# Patient Record
Sex: MALE | Race: WHITE | Employment: OTHER | URBAN - METROPOLITAN AREA
[De-identification: names, ages, dates, MRNs, and addresses within clinical notes are randomized per-mention and may not be internally consistent; named-entity substitution may affect disease eponyms.]

---

## 2017-03-24 ENCOUNTER — TELEPHONE (OUTPATIENT)
Dept: INTERNAL MEDICINE CLINIC | Age: 57
End: 2017-03-24

## 2017-03-24 NOTE — TELEPHONE ENCOUNTER
Pt is wanting a call back in regards to getting a sooner NP appt with Dr. Michell Hendricks that's available. Please call pt to advise.

## 2021-01-11 ENCOUNTER — HOSPITAL ENCOUNTER (EMERGENCY)
Age: 61
Discharge: HOME | End: 2021-01-11
Attending: EMERGENCY MEDICINE
Payer: MEDICAID

## 2021-01-11 ENCOUNTER — APPOINTMENT (OUTPATIENT)
Dept: GENERAL RADIOLOGY | Age: 61
End: 2021-01-11
Attending: STUDENT IN AN ORGANIZED HEALTH CARE EDUCATION/TRAINING PROGRAM
Payer: MEDICAID

## 2021-01-11 VITALS
DIASTOLIC BLOOD PRESSURE: 84 MMHG | RESPIRATION RATE: 18 BRPM | TEMPERATURE: 98.2 F | HEART RATE: 68 BPM | OXYGEN SATURATION: 100 % | SYSTOLIC BLOOD PRESSURE: 134 MMHG

## 2021-01-11 DIAGNOSIS — S60.222A CONTUSION OF LEFT HAND, INITIAL ENCOUNTER: Primary | ICD-10-CM
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
## 2021-01-11 PROCEDURE — 73130 X-RAY EXAM OF HAND: CPT

## 2021-01-11 PROCEDURE — 99282 EMERGENCY DEPT VISIT SF MDM: CPT

## 2021-01-11 RX ORDER — NAPROXEN SODIUM 220 MG
440 TABLET ORAL
Qty: 20 TAB | Refills: 0 | Status: SHIPPED | OUTPATIENT
Start: 2021-01-11 | End: 2021-03-03 | Stop reason: ALTCHOICE
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
## 2021-01-11 NOTE — Clinical Note
[address] 
[facility] EMERGENCY DEPT 
[address] [place] [id number] 
[phone] Work/School Note Date: 1/11/2021 To Whom It May concern: 
 
 
Rae Bruno was seen and treated today in the emergency room by the following provider(s): 
Attending Provider: Nayely Hutchinson MD 
Resident: Jeanne Parker MD.   
 
Rae Bruno is excused from work/school on 01/11/21. He is clear to return to work/school on 01/12/21. Sincerely, Dorothy Beckett MD

## 2021-01-11 NOTE — DISCHARGE INSTRUCTIONS
You may take ibuprofen 600mg every 6 hours as needed or naproxen 440mg every 12 hours as needed. Please do not take both. They are both in the family of NSAIDs and can cause stomach upset and kidney injury in overdose. Please do not take for longer than one week at a time. You may also use ice packs on and off for 15 minutes at a time to reduce swelling and pain. Please follow-up with Dr. Thania Balderrama as above regarding the pain in your right hand for further recommendations. Thank you!

## 2021-01-11 NOTE — ED NOTES
Pt discharged by Evangelina. Pt provided with discharge instructions RX and instructions on follow up care. Pt out of ED with no apparent difficulty accompanied by RN.

## 2021-02-21 ENCOUNTER — APPOINTMENT (OUTPATIENT)
Dept: GENERAL RADIOLOGY | Age: 61
End: 2021-02-21
Attending: EMERGENCY MEDICINE
Payer: MEDICAID

## 2021-02-21 ENCOUNTER — HOSPITAL ENCOUNTER (EMERGENCY)
Age: 61
Discharge: HOME | End: 2021-02-21
Attending: EMERGENCY MEDICINE
Payer: MEDICAID

## 2021-02-21 VITALS
WEIGHT: 188.93 LBS | SYSTOLIC BLOOD PRESSURE: 127 MMHG | HEART RATE: 69 BPM | OXYGEN SATURATION: 95 % | HEIGHT: 67 IN | BODY MASS INDEX: 29.65 KG/M2 | DIASTOLIC BLOOD PRESSURE: 80 MMHG | RESPIRATION RATE: 17 BRPM | TEMPERATURE: 98.2 F

## 2021-02-21 DIAGNOSIS — R07.9 CHEST PAIN, UNSPECIFIED TYPE: Primary | ICD-10-CM

## 2021-02-21 LAB
ALBUMIN SERPL-MCNC: 3.7 G/DL (ref 3.5–5)
ALBUMIN/GLOB SERPL: 1.1 {RATIO} (ref 1.1–2.2)
ALP SERPL-CCNC: 53 U/L (ref 45–117)
ALT SERPL-CCNC: 27 U/L (ref 12–78)
ANION GAP SERPL CALC-SCNC: 4 MMOL/L (ref 5–15)
AST SERPL-CCNC: 15 U/L (ref 15–37)
ATRIAL RATE: 76 BPM
BASOPHILS # BLD: 0.1 K/UL (ref 0–0.1)
BASOPHILS NFR BLD: 1 % (ref 0–1)
BILIRUB SERPL-MCNC: 0.7 MG/DL (ref 0.2–1)
BUN SERPL-MCNC: 19 MG/DL (ref 6–20)
BUN/CREAT SERPL: 19 (ref 12–20)
CALCIUM SERPL-MCNC: 8.7 MG/DL (ref 8.5–10.1)
CALCULATED P AXIS, ECG09: 9 DEGREES
CALCULATED R AXIS, ECG10: 13 DEGREES
CALCULATED T AXIS, ECG11: 24 DEGREES
CHLORIDE SERPL-SCNC: 108 MMOL/L (ref 97–108)
CK SERPL-CCNC: 79 U/L (ref 39–308)
CO2 SERPL-SCNC: 28 MMOL/L (ref 21–32)
COMMENT, HOLDF: NORMAL
CREAT SERPL-MCNC: 0.99 MG/DL (ref 0.7–1.3)
DIAGNOSIS, 93000: NORMAL
DIFFERENTIAL METHOD BLD: ABNORMAL
EOSINOPHIL # BLD: 0.2 K/UL (ref 0–0.4)
EOSINOPHIL NFR BLD: 3 % (ref 0–7)
ERYTHROCYTE [DISTWIDTH] IN BLOOD BY AUTOMATED COUNT: 12.3 % (ref 11.5–14.5)
GLOBULIN SER CALC-MCNC: 3.3 G/DL (ref 2–4)
GLUCOSE SERPL-MCNC: 86 MG/DL (ref 65–100)
HCT VFR BLD AUTO: 40 % (ref 36.6–50.3)
HGB BLD-MCNC: 13.8 G/DL (ref 12.1–17)
IMM GRANULOCYTES # BLD AUTO: 0 K/UL (ref 0–0.04)
IMM GRANULOCYTES NFR BLD AUTO: 1 % (ref 0–0.5)
LYMPHOCYTES # BLD: 2.2 K/UL (ref 0.8–3.5)
LYMPHOCYTES NFR BLD: 27 % (ref 12–49)
MCH RBC QN AUTO: 31.7 PG (ref 26–34)
MCHC RBC AUTO-ENTMCNC: 34.5 G/DL (ref 30–36.5)
MCV RBC AUTO: 92 FL (ref 80–99)
MONOCYTES # BLD: 0.6 K/UL (ref 0–1)
MONOCYTES NFR BLD: 7 % (ref 5–13)
NEUTS SEG # BLD: 5 K/UL (ref 1.8–8)
NEUTS SEG NFR BLD: 61 % (ref 32–75)
NRBC # BLD: 0 K/UL (ref 0–0.01)
NRBC BLD-RTO: 0 PER 100 WBC
P-R INTERVAL, ECG05: 206 MS
PLATELET # BLD AUTO: 231 K/UL (ref 150–400)
PMV BLD AUTO: 9.9 FL (ref 8.9–12.9)
POTASSIUM SERPL-SCNC: 4.2 MMOL/L (ref 3.5–5.1)
PROT SERPL-MCNC: 7 G/DL (ref 6.4–8.2)
Q-T INTERVAL, ECG07: 384 MS
QRS DURATION, ECG06: 80 MS
QTC CALCULATION (BEZET), ECG08: 432 MS
RBC # BLD AUTO: 4.35 M/UL (ref 4.1–5.7)
SAMPLES BEING HELD,HOLD: NORMAL
SODIUM SERPL-SCNC: 140 MMOL/L (ref 136–145)
TROPONIN I SERPL-MCNC: <0.05 NG/ML
VENTRICULAR RATE, ECG03: 76 BPM
WBC # BLD AUTO: 8 K/UL (ref 4.1–11.1)

## 2021-02-21 PROCEDURE — 85025 COMPLETE CBC W/AUTO DIFF WBC: CPT

## 2021-02-21 PROCEDURE — 84484 ASSAY OF TROPONIN QUANT: CPT

## 2021-02-21 PROCEDURE — 74011000250 HC RX REV CODE- 250: Performed by: EMERGENCY MEDICINE

## 2021-02-21 PROCEDURE — 80053 COMPREHEN METABOLIC PANEL: CPT

## 2021-02-21 PROCEDURE — 96374 THER/PROPH/DIAG INJ IV PUSH: CPT

## 2021-02-21 PROCEDURE — 71045 X-RAY EXAM CHEST 1 VIEW: CPT

## 2021-02-21 PROCEDURE — 82550 ASSAY OF CK (CPK): CPT

## 2021-02-21 PROCEDURE — 93005 ELECTROCARDIOGRAM TRACING: CPT

## 2021-02-21 PROCEDURE — 99284 EMERGENCY DEPT VISIT MOD MDM: CPT

## 2021-02-21 PROCEDURE — 36415 COLL VENOUS BLD VENIPUNCTURE: CPT

## 2021-02-21 PROCEDURE — 74011250636 HC RX REV CODE- 250/636: Performed by: EMERGENCY MEDICINE

## 2021-02-21 RX ORDER — KETOROLAC TROMETHAMINE 30 MG/ML
30 INJECTION, SOLUTION INTRAMUSCULAR; INTRAVENOUS
Status: COMPLETED | OUTPATIENT
Start: 2021-02-21 | End: 2021-02-21

## 2021-02-21 RX ORDER — LIDOCAINE 4 G/100G
1 PATCH TOPICAL EVERY 24 HOURS
Status: DISCONTINUED | OUTPATIENT
Start: 2021-02-21 | End: 2021-02-22 | Stop reason: HOSPADM

## 2021-02-21 RX ADMIN — KETOROLAC TROMETHAMINE 30 MG: 30 INJECTION, SOLUTION INTRAMUSCULAR at 21:41

## 2021-02-22 NOTE — ED PROVIDER NOTES
EMERGENCY DEPARTMENT HISTORY AND PHYSICAL EXAM      Date: 2/21/2021  Patient Name: Julianne Brown    History of Presenting Illness     Chief Complaint   Patient presents with    Chest Pain     reports chest tightness. notes he did some deep breathing exercises earlier today and is concerned he may have a lung problem. History Provided By: Patient    HPI: Julianne Brown, 62 y.o. male presents to the ED with cc of chest pain. Patient is a 62 YOM with a history of tobacco abuse who presents with L chest pain. Patient reports he has history of tobacco abuse, no history of CAD. No recent exertional chest pain. Patient reports he woke up with left chest wall pain. Pain is located in his left chest, and is worse with twisting. Improved by nothing. It is also pleuritic. He denies cough, hemoptysis. Denies history of DVT or PE. Denies leg swelling. Patient reports he was in his normal state of health until 10 AM this morning when he did some \"deep breathing. \"    Patient otherwise been in normal state of health. Denies associated shortness of breath, diaphoresis or nausea. Denies any other complaints. There are no other complaints, changes, or physical findings at this time. PCP: Tee Davison MD    No current facility-administered medications on file prior to encounter. Current Outpatient Medications on File Prior to Encounter   Medication Sig Dispense Refill    naproxen sodium (Aleve) 220 mg tablet Take 2 Tabs by mouth every twelve (12) hours as needed for Pain. 20 Tab 0    naproxen (NAPROSYN) 500 mg tablet Take 1 tablet by mouth two (2) times daily (with meals). 30 tablet 1    finasteride (PROSCAR) 5 mg tablet Take 1 Tab by mouth daily.  30 Tab 11       Past History     Past Medical History:  Past Medical History:   Diagnosis Date    Carpal tunnel syndrome     Lower urinary tract symptoms (LUTS)        Past Surgical History:  Past Surgical History:   Procedure Laterality Date    HX CHOLECYSTECTOMY         Family History:  No family history on file. Social History:  Social History     Tobacco Use    Smoking status: Current Some Day Smoker     Types: Cigarettes     Last attempt to quit: 2014     Years since quittin.1    Smokeless tobacco: Current User    Tobacco comment: 1 a week   Substance Use Topics    Alcohol use: Yes     Comment: occasonally on the weekends    Drug use: No       Allergies:  No Known Allergies      Review of Systems   Review of Systems   Constitutional: Negative for fever. HENT: Negative for voice change. Eyes: Negative for pain and redness. Respiratory: Negative for cough and chest tightness. Cardiovascular: Positive for chest pain. Negative for leg swelling. Gastrointestinal: Negative for abdominal pain, diarrhea, nausea and vomiting. Genitourinary: Negative for hematuria. Musculoskeletal: Negative for gait problem. Skin: Negative for color change, pallor and rash. Neurological: Negative for facial asymmetry, weakness and headaches. Hematological: Does not bruise/bleed easily. Psychiatric/Behavioral: Negative for behavioral problems. All other systems reviewed and are negative. Physical Exam   Physical Exam  Vitals signs and nursing note reviewed. Constitutional:       Comments: 44-year-old male, resting in bed, no acute distress   HENT:      Head: Normocephalic. Right Ear: External ear normal.      Left Ear: External ear normal.      Nose: Nose normal.   Eyes:      Conjunctiva/sclera: Conjunctivae normal.   Cardiovascular:      Rate and Rhythm: Normal rate and regular rhythm. Heart sounds: No murmur. No friction rub. No gallop. Pulmonary:      Effort: Pulmonary effort is normal.      Breath sounds: Normal breath sounds. No wheezing, rhonchi or rales. Chest:      Chest wall: Tenderness (Left costochondral margin) present. Abdominal:      Palpations: Abdomen is soft. Tenderness:  There is no abdominal tenderness. Musculoskeletal: Normal range of motion. Skin:     General: Skin is warm. Capillary Refill: Capillary refill takes less than 2 seconds. Neurological:      Mental Status: He is alert. Mental status is at baseline. Psychiatric:         Mood and Affect: Mood normal.         Behavior: Behavior normal.         Diagnostic Study Results     Labs -     Recent Results (from the past 12 hour(s))   EKG, 12 LEAD, INITIAL    Collection Time: 02/21/21  7:13 PM   Result Value Ref Range    Ventricular Rate 76 BPM    Atrial Rate 76 BPM    P-R Interval 206 ms    QRS Duration 80 ms    Q-T Interval 384 ms    QTC Calculation (Bezet) 432 ms    Calculated P Axis 9 degrees    Calculated R Axis 13 degrees    Calculated T Axis 24 degrees    Diagnosis       Normal sinus rhythm  Low voltage QRS  No previous ECGs available     CBC WITH AUTOMATED DIFF    Collection Time: 02/21/21  8:05 PM   Result Value Ref Range    WBC 8.0 4.1 - 11.1 K/uL    RBC 4.35 4.10 - 5.70 M/uL    HGB 13.8 12.1 - 17.0 g/dL    HCT 40.0 36.6 - 50.3 %    MCV 92.0 80.0 - 99.0 FL    MCH 31.7 26.0 - 34.0 PG    MCHC 34.5 30.0 - 36.5 g/dL    RDW 12.3 11.5 - 14.5 %    PLATELET 049 088 - 070 K/uL    MPV 9.9 8.9 - 12.9 FL    NRBC 0.0 0  WBC    ABSOLUTE NRBC 0.00 0.00 - 0.01 K/uL    NEUTROPHILS 61 32 - 75 %    LYMPHOCYTES 27 12 - 49 %    MONOCYTES 7 5 - 13 %    EOSINOPHILS 3 0 - 7 %    BASOPHILS 1 0 - 1 %    IMMATURE GRANULOCYTES 1 (H) 0.0 - 0.5 %    ABS. NEUTROPHILS 5.0 1.8 - 8.0 K/UL    ABS. LYMPHOCYTES 2.2 0.8 - 3.5 K/UL    ABS. MONOCYTES 0.6 0.0 - 1.0 K/UL    ABS. EOSINOPHILS 0.2 0.0 - 0.4 K/UL    ABS. BASOPHILS 0.1 0.0 - 0.1 K/UL    ABS. IMM.  GRANS. 0.0 0.00 - 0.04 K/UL    DF AUTOMATED     METABOLIC PANEL, COMPREHENSIVE    Collection Time: 02/21/21  8:05 PM   Result Value Ref Range    Sodium 140 136 - 145 mmol/L    Potassium 4.2 3.5 - 5.1 mmol/L    Chloride 108 97 - 108 mmol/L    CO2 28 21 - 32 mmol/L    Anion gap 4 (L) 5 - 15 mmol/L    Glucose 86 65 - 100 mg/dL    BUN 19 6 - 20 MG/DL    Creatinine 0.99 0.70 - 1.30 MG/DL    BUN/Creatinine ratio 19 12 - 20      GFR est AA >60 >60 ml/min/1.73m2    GFR est non-AA >60 >60 ml/min/1.73m2    Calcium 8.7 8.5 - 10.1 MG/DL    Bilirubin, total 0.7 0.2 - 1.0 MG/DL    ALT (SGPT) 27 12 - 78 U/L    AST (SGOT) 15 15 - 37 U/L    Alk. phosphatase 53 45 - 117 U/L    Protein, total 7.0 6.4 - 8.2 g/dL    Albumin 3.7 3.5 - 5.0 g/dL    Globulin 3.3 2.0 - 4.0 g/dL    A-G Ratio 1.1 1.1 - 2.2     TROPONIN I    Collection Time: 02/21/21  8:05 PM   Result Value Ref Range    Troponin-I, Qt. <0.05 <0.05 ng/mL   CK W/ REFLX CKMB    Collection Time: 02/21/21  8:05 PM   Result Value Ref Range    CK 79 39 - 308 U/L   SAMPLES BEING HELD    Collection Time: 02/21/21  8:05 PM   Result Value Ref Range    SAMPLES BEING HELD  RED, SST     COMMENT        Add-on orders for these samples will be processed based on acceptable specimen integrity and analyte stability, which may vary by analyte. Radiologic Studies -   XR CHEST PORT   Final Result   Mild pulmonary interstitial edema pattern. Otherwise, no acute   process. CT Results  (Last 48 hours)    None        CXR Results  (Last 48 hours)               02/21/21 1924  XR CHEST PORT Final result    Impression:  Mild pulmonary interstitial edema pattern. Otherwise, no acute   process. Narrative:  INDICATION: Chest pain       FINDINGS: AP portable imaging of the chest performed at 7:16 PM demonstrates a   normal cardiomediastinal silhouette. There is a mild pulmonary interstitial   edema pattern. No focal consolidation or pleural effusion is evident. . No   significant osseous abnormalities are seen. Medical Decision Making   I am the first provider for this patient. I reviewed the vital signs, available nursing notes, past medical history, past surgical history, family history and social history. Vital Signs-Reviewed the patient's vital signs.   Patient Vitals for the past 12 hrs:   Temp Pulse Resp BP SpO2   02/21/21 2000  80 24 118/74 99 %   02/21/21 1902 98.2 °F (36.8 °C) 80 16 (!) 142/87 97 %       EKG interpretation: (Preliminary)    Preliminary EKG interpreted by me. Shows sinus rhythm with a HR of 76. No ST elevations or depressions concerning for ischemia. Normal intervals. Records Reviewed: Nursing Notes and Old Medical Records    Provider Notes (Medical Decision Making):     71-year-old male presents emergency department with chief complaint of left chest wall pain. His vitals are stable he is not hypoxic. He has bilateral breath sounds. His pain is reproducible and atypical.  EKG is nonischemic. Given reproducibility on my exam and no risk factors doubt DVT or PE. Checked chest x-ray to exclude pneumothorax which is negative. Doubt pneumonia. Radiologist read on CXR mild interstitial pattern, but patient has no rales or crackles or signs or symptoms of CHF. ED Course:   Initial assessment performed. The patients presenting problems have been discussed, and they are in agreement with the care plan formulated and outlined with them. I have encouraged them to ask questions as they arise throughout their visit. ED Course as of Feb 21 2158   [facility] Feb 21, 2021 2109 Labs and troponin are unremarkable. [MB]   2129 Reassessed patient, resting comfortably in bed. Will give Toradol and lidocaine patch. Discussed patient's chest x-ray findings, declined Covid test.  Again I think these are mainly incidental as he does not appear fluid overloaded or in CHF exacerbation. [MB]      ED Course User Index  [MB] MD Rei Roe MD      Disposition:    Discharged    DISCHARGE PLAN:  1. Current Discharge Medication List        2.    Follow-up Information     Follow up With Specialties Details Why Contact Info    Tee Davison MD Internal Medicine In 1 week  [address] [place] [address] [place] [address]  [phone]      Malika Ramsey MD Cardiology, [facility], Internal Medicine In 1 week As needed [address] [facility]  [phone]          3. Return to ED if worse     Diagnosis     Clinical Impression:   1. Chest pain, unspecified type        Attestations:    Aldo Contreras MD    Please note that this dictation was completed with [employer], the computer voice recognition software. Quite often unanticipated grammatical, syntax, homophones, and other interpretive errors are inadvertently transcribed by the computer software. Please disregard these errors. Please excuse any errors that have escaped final proofreading. Thank you.

## 2021-02-22 NOTE — ED NOTES
12 - assumed care;;    ED visit d/t chest pain / sob - onset of sxs, this AM s/p \" breathing exercises \" - pt reports having \" feeling weird in the morning leading to mild sob, leading to deep breathing exercises, admit to smoking 2 to 3 cigarette daily for \" 40 years \" - pt with active central chest pain with no radiation - A/Ox4 at this time - placed on cardiac monitor x4     Denies nausea / vomiting / vision changes / cardiac hx / weight gain / lower extremity swelling     2125 - Alejandro CARVAJAL at bedside for reeval    Hx of gall bladder removal

## 2021-03-03 ENCOUNTER — OFFICE VISIT (OUTPATIENT)
Dept: CARDIOLOGY CLINIC | Age: 61
End: 2021-03-03
Payer: MEDICAID

## 2021-03-03 VITALS
DIASTOLIC BLOOD PRESSURE: 76 MMHG | RESPIRATION RATE: 18 BRPM | BODY MASS INDEX: 29.19 KG/M2 | HEART RATE: 64 BPM | HEIGHT: 67 IN | WEIGHT: 186 LBS | SYSTOLIC BLOOD PRESSURE: 128 MMHG | OXYGEN SATURATION: 96 %

## 2021-03-03 DIAGNOSIS — E78.2 MIXED HYPERLIPIDEMIA: ICD-10-CM

## 2021-03-03 DIAGNOSIS — R07.9 CHEST PAIN, UNSPECIFIED TYPE: Primary | ICD-10-CM

## 2021-03-03 DIAGNOSIS — F17.200 TOBACCO USE DISORDER: ICD-10-CM

## 2021-03-03 PROCEDURE — 93000 ELECTROCARDIOGRAM COMPLETE: CPT | Performed by: INTERNAL MEDICINE

## 2021-03-03 PROCEDURE — 99204 OFFICE O/P NEW MOD 45 MIN: CPT | Performed by: INTERNAL MEDICINE

## 2021-03-03 RX ORDER — MONTELUKAST SODIUM 4 MG/1
1 TABLET, CHEWABLE ORAL
COMMUNITY
Start: 2021-01-28

## 2021-03-03 NOTE — PROGRESS NOTES
Alexys Bentley, JOSE RAMON-BC    Subjective/HPI:     Monica Newman is a 62 y.o. male is here to establish care. He has no significant PMHx. Here for evaluation of chest pain. Was seen in the ER two weeks ago for chest pain symptoms. Started in the morning and lasted a few hours before he presented to the ER. Workup there was unremarkable, non-ischemic EKG, normal chemistries and troponin. Was discharged with recommendation to follow up with us outpatient. Still having symptoms from time to time, not related to exertion. States \"movement\" brings it on, like bending or stooping. Some worsening with inspiration. Took some Aleve without much relief. Is a current smoker, 2-3 cigarettes per day. Reports the night prior to his chest pain symptoms, he smoked 12 cigarettes. No previous history of CAD. No family hx of CAD. Used to smoke marijuana occasionally, and did cocaine about 15-20 years ago, has been abstinent. Past Medical History:   Diagnosis Date    Carpal tunnel syndrome     Lower urinary tract symptoms (LUTS)        Past Surgical History:   Procedure Laterality Date    HX CHOLECYSTECTOMY         No family history on file.     Social History     Socioeconomic History    Marital status: SINGLE     Spouse name: Not on file    Number of children: Not on file    Years of education: Not on file    Highest education level: Not on file   Occupational History    Not on file   Social Needs    Financial resource strain: Not on file    Food insecurity     Worry: Not on file     Inability: Not on file    Transportation needs     Medical: Not on file     Non-medical: Not on file   Tobacco Use    Smoking status: Current Some Day Smoker     Types: Cigarettes     Last attempt to quit: 2014     Years since quittin.1    Smokeless tobacco: Current User    Tobacco comment: 1 a week   Substance and Sexual Activity    Alcohol use: Yes     Comment: occasonally on the weekends    Drug use: No    Sexual activity: Not on file   Lifestyle    Physical activity     Days per week: Not on file     Minutes per session: Not on file    Stress: Not on file   Relationships    Social connections     Talks on phone: Not on file     Gets together: Not on file     Attends Jain service: Not on file     Active member of club or organization: Not on file     Attends meetings of clubs or organizations: Not on file     Relationship status: Not on file    Intimate partner violence     Fear of current or ex partner: Not on file     Emotionally abused: Not on file     Physically abused: Not on file     Forced sexual activity: Not on file   Other Topics Concern    Not on file   Social History Narrative    Not on file       No Known Allergies    Current Outpatient Medications on File Prior to Visit   Medication Sig Dispense Refill    colestipoL (COLESTID) 1 gram tablet 1 g. 1 tablet 2-3 times a week       No current facility-administered medications on file prior to visit. Lab Results   Component Value Date/Time    Cholesterol, total 165 03/23/2011 09:07 AM    HDL Cholesterol 46 03/23/2011 09:07 AM    LDL, calculated 91.8 03/23/2011 09:07 AM    VLDL, calculated 27.2 03/23/2011 09:07 AM    Triglyceride 136 03/23/2011 09:07 AM    CHOL/HDL Ratio 3.6 03/23/2011 09:07 AM     No results found for: CPK, CPKX, CPX  Lab Results   Component Value Date/Time    Sodium 140 02/21/2021 08:05 PM    Potassium 4.2 02/21/2021 08:05 PM    Chloride 108 02/21/2021 08:05 PM    CO2 28 02/21/2021 08:05 PM    Anion gap 4 (L) 02/21/2021 08:05 PM    Glucose 86 02/21/2021 08:05 PM    BUN 19 02/21/2021 08:05 PM    Creatinine 0.99 02/21/2021 08:05 PM    BUN/Creatinine ratio 19 02/21/2021 08:05 PM    GFR est AA >60 02/21/2021 08:05 PM    GFR est non-AA >60 02/21/2021 08:05 PM    Calcium 8.7 02/21/2021 08:05 PM    Bilirubin, total 0.7 02/21/2021 08:05 PM    Alk.  phosphatase 53 02/21/2021 08:05 PM    Protein, total 7.0 02/21/2021 08:05 PM    Albumin 3.7 02/21/2021 08:05 PM    Globulin 3.3 02/21/2021 08:05 PM    A-G Ratio 1.1 02/21/2021 08:05 PM    ALT (SGPT) 27 02/21/2021 08:05 PM       Review of Symptoms:    Review of Systems   Constitutional: Negative for chills, fever and weight loss. HENT: Negative for nosebleeds. Eyes: Negative for blurred vision and double vision. Respiratory: Negative for cough, shortness of breath and wheezing. Cardiovascular: Negative for chest pain, palpitations, orthopnea, leg swelling and PND. Gastrointestinal: Negative for abdominal pain, blood in stool, diarrhea, nausea and vomiting. Musculoskeletal: Negative for joint pain. Skin: Negative for rash. Neurological: Negative for dizziness, tingling and loss of consciousness. Endo/Heme/Allergies: Does not bruise/bleed easily. Physical Exam:      General: Well developed, in no acute distress, cooperative and alert  Heart:  reg rate and rhythm; normal S1/S2; no murmurs, no gallops or rubs. Respiratory: Clear bilaterally x 4, no wheezing or rales  Extremities:  Normal cap refill, no cyanosis, atraumatic. No edema. Vascular: 2+ pulses symmetric in all extremities    Vitals:    03/03/21 1443   BP: 128/76   Pulse: 64   Resp: 18   SpO2: 96%   Weight: 186 lb (84.4 kg)   Height: 5' 7\" (1.702 m)       ECG: sinus rhythm     Assessment:       ICD-10-CM ICD-9-CM    1. Chest pain, unspecified type  R07.9 786.50 AMB POC EKG ROUTINE W/ 12 LEADS, INTER & REP      LIPID PANEL      METABOLIC PANEL, COMPREHENSIVE      ECHO ADULT COMPLETE      EXERCISE CARDIAC STRESS TEST      LIPID PANEL   2. Mixed hyperlipidemia  E78.2 272.2    3. Tobacco use disorder  F17.200 305.1         Plan:     Chest pain  Atypical symptoms, some with inspiration  ?  MSK vs pulmonary in etiology  Will evaluate with exercise stress test  Check echo  Check lipids  If stress test normal, Coronary calcium scoring would be reassuring if totally normal and threshold for further testing/treatment would be decreased if high- discuss after stress test is back. Mixed hyperlipidemia:  On cholestipol  Check lipids    Tobacco use disorder:  Briefly counseled, repeat at f/u    Follow-up to be determined based on test results.        Eleni Goodwin MD

## 2021-03-03 NOTE — LETTER
3/6/2021 Patient: Roxana Harrison YOB: 1960 Date of Visit: 3/3/2021 Alexus García MD 
[address] [place], [place] [address]a. [address] [address] [address] [employer] Dear Alexus García MD, Thank you for referring Mr. Roxana Harrison to [address] [place] [address] for evaluation. My notes for this consultation are attached. If you have questions, please do not hesitate to call me. I look forward to following your patient along with you.  
 
 
Sincerely, 
 
Anabelle Almaraz MD

## 2021-03-03 NOTE — PROGRESS NOTES
1. Have you been to the ER, urgent care clinic since your last visit? Hospitalized since your last visit? Yes, 2/21/21, ER, CP    2. Have you seen or consulted any other health care providers outside of the [address] [place] [address] since your last visit? Include any pap smears or colon screening.  No        Chief Complaint   Patient presents with    Chest Pain (Angina)     C/O CP

## 2021-03-12 ENCOUNTER — TELEPHONE (OUTPATIENT)
Dept: CARDIOLOGY CLINIC | Age: 61
End: 2021-03-12

## 2021-03-12 NOTE — TELEPHONE ENCOUNTER
----- Message from Rachel Mcelroy NP sent at 3/11/2021  8:58 AM EST -----  Labs all WNL. Lipids at goal since no hx cad or dm.   Continue colestid

## 2021-03-19 ENCOUNTER — TELEPHONE (OUTPATIENT)
Dept: CARDIOLOGY CLINIC | Age: 61
End: 2021-03-19

## 2021-03-19 NOTE — TELEPHONE ENCOUNTER
Left voice mail for patient to call to change either the echo or stress echo that I scheduled. Told him we could change either one that suits him better.     Thanks,  Reha Bull

## 2021-03-24 ENCOUNTER — OFFICE VISIT (OUTPATIENT)
Dept: HEMATOLOGY | Age: 61
End: 2021-03-24
Payer: MEDICAID

## 2021-03-24 VITALS
RESPIRATION RATE: 20 BRPM | DIASTOLIC BLOOD PRESSURE: 73 MMHG | HEART RATE: 63 BPM | WEIGHT: 183.6 LBS | SYSTOLIC BLOOD PRESSURE: 114 MMHG | HEIGHT: 67 IN | OXYGEN SATURATION: 97 % | BODY MASS INDEX: 28.82 KG/M2 | TEMPERATURE: 97.4 F

## 2021-03-24 DIAGNOSIS — R39.9 LOWER URINARY TRACT SYMPTOMS (LUTS): Primary | ICD-10-CM

## 2021-03-24 DIAGNOSIS — R74.8 ELEVATED LIVER ENZYMES: ICD-10-CM

## 2021-03-24 PROBLEM — Z90.49 STATUS POST CHOLECYSTECTOMY: Status: ACTIVE | Noted: 2021-03-24

## 2021-03-24 PROBLEM — F10.11 HISTORY OF ALCOHOL ABUSE: Status: ACTIVE | Noted: 2021-03-24

## 2021-03-24 PROCEDURE — 99204 OFFICE O/P NEW MOD 45 MIN: CPT | Performed by: HOSPITALIST

## 2021-03-24 NOTE — PROGRESS NOTES
Jason Luna [address]      Doris Jaramillo MD, Camila Ramirez, Amparo Ramires MD, MPH      Quynh Stephens, PA-LUANNE Vegas, [facility]     Nayely Everett, [facility]   Cali Silva, [facility]    Shayla Stubbs, [facility]       Rubén Sandoval [facility] 136    at [address], [address] [place] [address] Sushila Babin  22.    [phone]    FAX: [address] [place] [address], [address]    [phone]    FAX: [phone]     Patient Care Team:  Neda Gomez MD as PCP - General (Internal Medicine)  Conception MD Louisa as Physician (Cardiology)    Problem List  Date Reviewed: 4/14/2021          Codes Class Noted    Alcohol abuse ICD-10-CM: F10.10  ICD-9-CM: 305.00  4/16/2021        Status post cholecystectomy ICD-10-CM: Z90.49  ICD-9-CM: V45.79  3/24/2021        History of alcohol abuse ICD-10-CM: F10.11  ICD-9-CM: 305.03  3/24/2021            The clinicians listed above have asked me to see Luis Batista in consultation regarding elevated liver enzymes and its management. All medical records sent by the referring physicians were reviewed including imaging studies . The patient is a 62 y.o.  male who presents to the clinic for evaluation for liver health    Serologic evaluation for markers of chronic liver disease was not not performed. Imaging of the liver was not performed. An assessment of liver fibrosis with biopsy or elastography has not been performed. The patient had not started any new medications within 3 months preceding the elevation in liver chemistries.     The patient reports occasional right upper quadrant abdominal pain    The patient is not experiencing the following symptoms which could be attributed to liver disorder: fatigue, yellowing of the eyes or skin, itching, dark urine,   problems concentrating, swelling of the abdomen, swelling of the lower extremities, hematemesis, hematochezia. The patient drinks alcohol heavily. He drinks bourbon mix with coke. He drinks about 2-3 drinks daily and once a week around 10 drinks per day    The patient completes all daily activities without any functional limitations. ASSESSMENT AND PLAN:  Alcohol abuse   The serum transaminase is elevated, the liver function is normal, the platelet count is normal  Serologic testing for causes of chronic liver disease was negative for HCV and HBV    Based upon laboratory studies the patient does not appear to have liver disease  We will perform liver ultrasound  The need to perform an assessment of liver fibrosis was discussed with the patient. The Fibroscan can assess liver fibrosis and determine if a patient has advanced fibrosis or cirrhosis without the need for liver biopsy. This will be performed at the next office visit. Treatment of other medical problems in patients with chronic liver disease  There are no contraindications for the patient to take most medications that are necessary for treatment of other medical issues. The patient consumes alcohol on a daily basis or has recently stopped consuming alcohol. Regular alcohol use increases the risk of toxicity from acetaminophen. This analgesic should be avoided until the patient has been abstinent from alcohol for 6 months. Counseling for alcohol in patients with chronic liver disease  The patient was counseled regarding alcohol consumption and the effect of alcohol on chronic liver disease. Vaccinations   Since the patient does not have a chronic liver disease which can lead to liver injury screening for HAV and HBV is not needed. Routine vaccinations against other bacterial and viral agents can be performed as indicated.   Annual flu vaccination should be administered if indicated. ALLERGIES  No Known Allergies    MEDICATIONS  Current Outpatient Medications   Medication Sig    colestipoL (COLESTID) 1 gram tablet 1 g. 1 tablet 2-3 times a week    b complex vitamins tablet Take 1 Tab by mouth daily.  OTHER Beet powder supplement daily    vitamin e 600 unit capsule Take 600 Units by mouth daily.  cholecalciferol (Vitamin D3) (5000 Units/125 mcg) tab tablet Take 5,000 Units by mouth daily.  biotin 10,000 mcg cap Take 1 Tab by mouth daily.  calcium carbonate (CALTREX) 600 mg calcium (1,500 mg) tablet Take 600 mg by mouth daily.  docosahexaenoic acid/epa (FISH OIL PO) Take 2,400 mg by mouth daily.  TURMERIC PO Take 1 Tab by mouth daily.  milk thistle 500 mg cap Take 1,000 mg by mouth daily. No current facility-administered medications for this visit. SYSTEM REVIEW NOT RELATED TO LIVER DISEASE OR REVIEWED ABOVE:  Constitution systems: Negative for fever, chills, weight gain, weight loss. Eyes: Negative for visual changes. ENT: Negative for sore throat, painful swallowing. Respiratory: Negative for cough, hemoptysis, SOB. Cardiology: Negative for chest pain, palpitations. GI:  Negative for constipation or diarrhea. : Negative for urinary frequency, dysuria, hematuria, nocturia. Skin: Negative for rash. Hematology: Negative for easy bruising, blood clots. Musculo-skelatal: Negative for back pain, muscle pain, weakness. Neurologic: Negative for headaches, dizziness, vertigo, memory problems not related to HE. Psychology: Negative for anxiety, depression. FAMILY HISTORY:  The father is . He  from the complications of alcoholic cirrhosis  The patient has no knowledge of the mother's medical condition. The following family members have liver disease: father  There is no family history of immune disorders. SOCIAL HISTORY:  The patient is   The patient has 1 child.     The patient currently smokes 1-3 cigarettes a day  The patient consumes alcohol in excess. The patient currently works full time as self employed     PHYSICAL EXAMINATION:  Visit Vitals  /73 (BP 1 Location: Right upper arm, BP Patient Position: Sitting, BP Cuff Size: Adult)   Pulse 63   Temp 97.4 °F (36.3 °C) (Temporal)   Resp 20   Ht 5' 7\" (1.702 m)   Wt 183 lb 9.6 oz (83.3 kg)   SpO2 97%   BMI 28.76 kg/m²     General: No acute distress. Eyes: Sclera anicteric. ENT: No oral lesions. Thyroid normal.  Nodes: No adenopathy. Skin: No spider angiomata. No jaundice. No palmar erythema. Respiratory: Lungs clear to auscultation. Cardiovascular: Regular heart rate. No murmurs. No JVD. Abdomen: Soft non-tender. Liver size normal to percussion/palpation. Spleen not palpable. No obvious ascites. Extremities: No edema. No muscle wasting. No gross arthritic changes. Neurologic: Alert and oriented. Cranial nerves grossly intact. No asterixis. LABORATORY STUDIES:  Recent liver function panel, CBC with platelet count and BMP are not available. These studies will be performed. [facility] of [address] [place] [address] Ref Rng & Units 3/10/2021 2/21/2021   WBC 4.1 - 11.1 K/uL  8.0   ANC 1.8 - 8.0 K/UL  5.0   HGB 12.1 - 17.0 g/dL  13.8    - 400 K/uL  231   AST 15 - 37 U/L 12 (L) 15   ALT 12 - 78 U/L 28 27   Alk Phos 45 - 117 U/L 57 53   Bili, Total 0.2 - 1.0 MG/DL 0.7 0.7   Albumin 3.5 - 5.0 g/dL 3.7 3.7   BUN 6 - 20 MG/DL 11 19   Creat 0.70 - 1.30 MG/DL 0.79 0.99   Na 136 - 145 mmol/L 137 140   K 3.5 - 5.1 mmol/L 4.5 4.2   Cl 97 - 108 mmol/L 108 108   CO2 21 - 32 mmol/L 26 28   Glucose 65 - 100 mg/dL 97 86     SEROLOGIES:  Not available or performed. Testing was performed today.   Serologies Latest Ref Rng & Units 3/24/2021   Hep B Surface Ag Negative Negative   Hep C Ab 0.0 - 0.9 s/co ratio <0.1     LIVER HISTOLOGY:  Not available or performed    ENDOSCOPIC PROCEDURES:  Not available or performed    RADIOLOGY:  Not available or performed    OTHER TESTING:  Not available or performed    FOLLOW-UP:  All of the issues listed above in the Assessment and Plan were discussed with the patient. All questions were answered. The patient expressed a clear understanding of the above. [address] [place] [address] in 3 months for routine monitoring and to review all data and determine the treatment plan.     Rayne Rodriguez MD, MPH  [facility]  [address] [address] [facility] 77 [address] [place], [address] [place] [address] 22.  [phone]  [address] [place] [address]

## 2021-03-24 NOTE — PROGRESS NOTES
Identified pt with two pt identifiers(name and ). Reviewed record in preparation for visit and have obtained necessary documentation. Chief Complaint   Patient presents with    New Patient      Vitals:    21 1309   BP: 114/73   Pulse: 63   Resp: 20   Temp: 97.4 °F (36.3 °C)   TempSrc: Temporal   SpO2: 97%   Weight: 183 lb 9.6 oz (83.3 kg)   Height: 5' 7\" (1.702 m)   PainSc:   0 - No pain       Health Maintenance Review: Patient reminded of \"due or due soon\" health maintenance. I have asked the patient to contact his/her primary care provider (PCP) for follow-up on his/her health maintenance. Coordination of Care Questionnaire:  :   1) Have you been to an emergency room, urgent care, or hospitalized since your last visit? If yes, where when, and reason for visit? yes     ED [facility]; X-ray for fingers    2. Have seen or consulted any other health care provider since your last visit? If yes, where when, and reason for visit? NO      Patient is accompanied by self I have received verbal consent from Norm Hardwick to discuss any/all medical information while they are present in the room. ,

## 2021-03-24 NOTE — LETTER
4/16/2021 Patient: Nick Nieves YOB: 1960 Date of Visit: 3/24/2021 Yina Mcintosh MD 
[address] [facility] [address] [address] [employer] Dear Yina Mcintosh MD, Thank you for referring Mr. Nick Nieves to [address] for evaluation. My notes for this consultation are attached. If you have questions, please do not hesitate to call me. I look forward to following your patient along with you. Sincerely, Walter Esparza MD

## 2021-03-25 ENCOUNTER — ANCILLARY PROCEDURE (OUTPATIENT)
Dept: CARDIOLOGY CLINIC | Age: 61
End: 2021-03-25
Payer: MEDICAID

## 2021-03-25 VITALS
SYSTOLIC BLOOD PRESSURE: 114 MMHG | BODY MASS INDEX: 28.72 KG/M2 | WEIGHT: 183 LBS | HEIGHT: 67 IN | DIASTOLIC BLOOD PRESSURE: 73 MMHG

## 2021-03-25 DIAGNOSIS — R07.9 CHEST PAIN, UNSPECIFIED TYPE: ICD-10-CM

## 2021-03-25 LAB
HBV SURFACE AG SERPL QL IA: NEGATIVE
HCV AB S/CO SERPL IA: <0.1 S/CO RATIO (ref 0–0.9)
HCV AB SERPL QL IA: NORMAL

## 2021-03-25 PROCEDURE — 93306 TTE W/DOPPLER COMPLETE: CPT | Performed by: INTERNAL MEDICINE

## 2021-03-26 ENCOUNTER — ANCILLARY PROCEDURE (OUTPATIENT)
Dept: CARDIOLOGY CLINIC | Age: 61
End: 2021-03-26
Payer: MEDICAID

## 2021-03-26 VITALS
BODY MASS INDEX: 28.72 KG/M2 | HEIGHT: 67 IN | SYSTOLIC BLOOD PRESSURE: 118 MMHG | WEIGHT: 183 LBS | DIASTOLIC BLOOD PRESSURE: 82 MMHG

## 2021-03-26 DIAGNOSIS — R07.9 CHEST PAIN, UNSPECIFIED TYPE: ICD-10-CM

## 2021-03-26 LAB
ECHO AO ASC DIAM: 3.61 CM
ECHO AO ROOT DIAM: 3.15 CM
ECHO AV PEAK GRADIENT: 6.16 MMHG
ECHO AV PEAK VELOCITY: 124.12 CM/S
ECHO LA AREA 4C: 15.69 CM2
ECHO LA MAJOR AXIS: 3.94 CM
ECHO LA MINOR AXIS: 2.02 CM
ECHO LA VOL 2C: 35.86 ML (ref 18–58)
ECHO LA VOL 4C: 36.65 ML (ref 18–58)
ECHO LA VOL BP: 41.67 ML (ref 18–58)
ECHO LA VOL/BSA BIPLANE: 21.4 ML/M2 (ref 16–28)
ECHO LA VOLUME INDEX A2C: 18.42 ML/M2 (ref 16–28)
ECHO LA VOLUME INDEX A4C: 18.82 ML/M2 (ref 16–28)
ECHO LV E' LATERAL VELOCITY: 12.85 CM/S
ECHO LV EDV A2C: 55.83 ML
ECHO LV EDV A4C: 58.17 ML
ECHO LV EDV BP: 61.11 ML (ref 67–155)
ECHO LV EDV INDEX A4C: 29.9 ML/M2
ECHO LV EDV INDEX BP: 31.4 ML/M2
ECHO LV EDV NDEX A2C: 28.7 ML/M2
ECHO LV EJECTION FRACTION A2C: 67 PERCENT
ECHO LV EJECTION FRACTION A4C: 53 PERCENT
ECHO LV EJECTION FRACTION BIPLANE: 58.7 PERCENT (ref 55–100)
ECHO LV ESV A2C: 18.68 ML
ECHO LV ESV A4C: 27.22 ML
ECHO LV ESV BP: 25.26 ML (ref 22–58)
ECHO LV ESV INDEX A2C: 9.6 ML/M2
ECHO LV ESV INDEX A4C: 14 ML/M2
ECHO LV ESV INDEX BP: 13 ML/M2
ECHO LV INTERNAL DIMENSION DIASTOLIC: 4.49 CM (ref 4.2–5.9)
ECHO LV INTERNAL DIMENSION SYSTOLIC: 3.11 CM
ECHO LV ISOVOLUMETRIC RELAXATION TIME (IVRT): 49.48 MS
ECHO LV IVSD: 1 CM (ref 0.6–1)
ECHO LV MASS 2D: 150.6 G (ref 88–224)
ECHO LV MASS INDEX 2D: 77.4 G/M2 (ref 49–115)
ECHO LV POSTERIOR WALL DIASTOLIC: 0.98 CM (ref 0.6–1)
ECHO LVOT PEAK GRADIENT: 4.01 MMHG
ECHO LVOT PEAK VELOCITY: 100.16 CM/S
ECHO MV "A" WAVE DURATION: 156.04 MS
ECHO MV A VELOCITY: 61.34 CM/S
ECHO MV E DECELERATION TIME (DT): 147.96 MS
ECHO MV E VELOCITY: 68.51 CM/S
ECHO MV E/A RATIO: 1.12
ECHO MV E/E' LATERAL: 5.33
ECHO PVEIN A DURATION: 108.47 MS
ECHO PVEIN A VELOCITY: 24.13 CM/S
ECHO RV TAPSE: 2.24 CM (ref 1.5–2)
LA VOL DISK BP: 38.92 ML (ref 18–58)

## 2021-03-26 PROCEDURE — 93015 CV STRESS TEST SUPVJ I&R: CPT | Performed by: INTERNAL MEDICINE

## 2021-03-29 LAB
STRESS ANGINA INDEX: 0
STRESS BASELINE DIAS BP: 82 MMHG
STRESS BASELINE HR: 71 BPM
STRESS BASELINE SYS BP: 118 MMHG
STRESS ESTIMATED WORKLOAD: 10.1 METS
STRESS EXERCISE DUR MIN: 9 MIN:SEC
STRESS O2 SAT PEAK: 95 %
STRESS O2 SAT REST: 97 %
STRESS PEAK DIAS BP: 80 MMHG
STRESS PEAK SYS BP: 150 MMHG
STRESS PERCENT HR ACHIEVED: 92 %
STRESS POST PEAK HR: 148 BPM
STRESS RATE PRESSURE PRODUCT: NORMAL BPM*MMHG
STRESS SR DUKE TREADMILL SCORE: 9
STRESS ST DEPRESSION: 0 MM
STRESS ST ELEVATION: 0 MM
STRESS STAGE 1 BP: NORMAL MMHG
STRESS STAGE 1 DURATION: 3 MIN:SEC
STRESS STAGE 1 HR: 93 BPM
STRESS STAGE 2 BP: NORMAL MMHG
STRESS STAGE 2 DURATION: 3 MIN:SEC
STRESS STAGE 2 HR: 114 BPM
STRESS STAGE 3 DURATION: 3 MIN:SEC
STRESS STAGE 3 HR: 148 BPM
STRESS STAGE RECOVERY 1 BP: NORMAL MMHG
STRESS STAGE RECOVERY 1 DURATION: NORMAL MIN:SEC
STRESS STAGE RECOVERY 1 HR: 81 BPM
STRESS TARGET HR: 161 BPM

## 2021-03-30 ENCOUNTER — TELEPHONE (OUTPATIENT)
Dept: CARDIOLOGY CLINIC | Age: 61
End: 2021-03-30

## 2021-03-30 NOTE — PROGRESS NOTES
Stress test normal, no active blood flow problem detected. HE can proceed with coronary calcium score if he wants to. I will put order in if so.

## 2021-03-30 NOTE — TELEPHONE ENCOUNTER
----- Message from Rachel Mcelroy NP sent at 3/30/2021 11:54 AM EDT -----  Stress test normal, no active blood flow problem detected. HE can proceed with coronary calcium score if he wants to. I will put order in if so.

## 2021-03-30 NOTE — TELEPHONE ENCOUNTER
Manual Crumbly., NP  Abel Teixeira, ACEN             Heart is pumping nice and strong, valves look good and healthy.    Will await stress test

## 2021-04-10 ENCOUNTER — HOSPITAL ENCOUNTER (OUTPATIENT)
Dept: PREADMISSION TESTING | Age: 61
Discharge: HOME | End: 2021-04-10
Payer: MEDICAID

## 2021-04-10 LAB — SARS-COV-2, COV2: NORMAL

## 2021-04-10 PROCEDURE — U0003 INFECTIOUS AGENT DETECTION BY NUCLEIC ACID (DNA OR RNA); SEVERE ACUTE RESPIRATORY SYNDROME CORONAVIRUS 2 (SARS-COV-2) (CORONAVIRUS DISEASE [COVID-19]), AMPLIFIED PROBE TECHNIQUE, MAKING USE OF HIGH THROUGHPUT TECHNOLOGIES AS DESCRIBED BY CMS-2020-01-R: HCPCS

## 2021-04-11 LAB — SARS-COV-2, COV2NT: NOT DETECTED

## 2021-04-13 RX ORDER — DIAPER,BRIEF,INFANT-TODD,DISP
1 EACH MISCELLANEOUS DAILY
COMMUNITY

## 2021-04-13 RX ORDER — CALCIUM CARBONATE 600 MG
600 TABLET ORAL DAILY
COMMUNITY

## 2021-04-13 RX ORDER — CALCIUM CARBONATE/VITAMIN D3 600 MG-125
1 TABLET ORAL DAILY
COMMUNITY
End: 2021-04-13

## 2021-04-13 RX ORDER — CHOLECALCIFEROL TAB 125 MCG (5000 UNIT) 125 MCG
5000 TAB ORAL DAILY
COMMUNITY

## 2021-04-13 RX ORDER — MILK THISTLE 500 MG
1000 CAPSULE ORAL DAILY
COMMUNITY

## 2021-04-13 RX ORDER — MULTIVIT WITH MINERALS/HERBS
1 TABLET ORAL DAILY
COMMUNITY

## 2021-04-13 RX ORDER — VITAMIN E ACETATE 600 UNIT
600 CAPSULE ORAL DAILY
COMMUNITY

## 2021-04-14 ENCOUNTER — HOSPITAL ENCOUNTER (OUTPATIENT)
Age: 61
Setting detail: OUTPATIENT SURGERY
Discharge: HOME | End: 2021-04-14
Attending: INTERNAL MEDICINE | Admitting: INTERNAL MEDICINE
Payer: MEDICAID

## 2021-04-14 ENCOUNTER — ANESTHESIA (OUTPATIENT)
Dept: ENDOSCOPY | Age: 61
End: 2021-04-14
Payer: MEDICAID

## 2021-04-14 ENCOUNTER — ANESTHESIA EVENT (OUTPATIENT)
Dept: ENDOSCOPY | Age: 61
End: 2021-04-14
Payer: MEDICAID

## 2021-04-14 VITALS
OXYGEN SATURATION: 97 % | BODY MASS INDEX: 28.96 KG/M2 | RESPIRATION RATE: 14 BRPM | SYSTOLIC BLOOD PRESSURE: 109 MMHG | TEMPERATURE: 98.6 F | WEIGHT: 184.5 LBS | HEIGHT: 67 IN | DIASTOLIC BLOOD PRESSURE: 75 MMHG | HEART RATE: 64 BPM

## 2021-04-14 PROCEDURE — 2709999900 HC NON-CHARGEABLE SUPPLY: Performed by: INTERNAL MEDICINE

## 2021-04-14 PROCEDURE — 74011250636 HC RX REV CODE- 250/636: Performed by: INTERNAL MEDICINE

## 2021-04-14 PROCEDURE — 76060000031 HC ANESTHESIA FIRST 0.5 HR: Performed by: INTERNAL MEDICINE

## 2021-04-14 PROCEDURE — 74011000250 HC RX REV CODE- 250: Performed by: ANESTHESIOLOGY

## 2021-04-14 PROCEDURE — 76040000019: Performed by: INTERNAL MEDICINE

## 2021-04-14 PROCEDURE — 88305 TISSUE EXAM BY PATHOLOGIST: CPT

## 2021-04-14 PROCEDURE — 74011250636 HC RX REV CODE- 250/636: Performed by: ANESTHESIOLOGY

## 2021-04-14 PROCEDURE — 77030013992 HC SNR POLYP ENDOSC BSC -B: Performed by: INTERNAL MEDICINE

## 2021-04-14 PROCEDURE — 77030021593 HC FCPS BIOP ENDOSC BSC -A: Performed by: INTERNAL MEDICINE

## 2021-04-14 RX ORDER — NALOXONE HYDROCHLORIDE 0.4 MG/ML
0.4 INJECTION, SOLUTION INTRAMUSCULAR; INTRAVENOUS; SUBCUTANEOUS
Status: DISCONTINUED | OUTPATIENT
Start: 2021-04-14 | End: 2021-04-14 | Stop reason: HOSPADM

## 2021-04-14 RX ORDER — EPINEPHRINE 0.1 MG/ML
1 INJECTION INTRACARDIAC; INTRAVENOUS
Status: DISCONTINUED | OUTPATIENT
Start: 2021-04-14 | End: 2021-04-14 | Stop reason: HOSPADM

## 2021-04-14 RX ORDER — SODIUM CHLORIDE 9 MG/ML
50 INJECTION, SOLUTION INTRAVENOUS CONTINUOUS
Status: DISCONTINUED | OUTPATIENT
Start: 2021-04-14 | End: 2021-04-14 | Stop reason: HOSPADM

## 2021-04-14 RX ORDER — PROPOFOL 10 MG/ML
INJECTION, EMULSION INTRAVENOUS AS NEEDED
Status: DISCONTINUED | OUTPATIENT
Start: 2021-04-14 | End: 2021-04-14 | Stop reason: HOSPADM

## 2021-04-14 RX ORDER — LIDOCAINE HYDROCHLORIDE 20 MG/ML
INJECTION, SOLUTION EPIDURAL; INFILTRATION; INTRACAUDAL; PERINEURAL AS NEEDED
Status: DISCONTINUED | OUTPATIENT
Start: 2021-04-14 | End: 2021-04-14 | Stop reason: HOSPADM

## 2021-04-14 RX ORDER — DEXTROMETHORPHAN/PSEUDOEPHED 2.5-7.5/.8
1.2 DROPS ORAL
Status: DISCONTINUED | OUTPATIENT
Start: 2021-04-14 | End: 2021-04-14 | Stop reason: HOSPADM

## 2021-04-14 RX ORDER — FLUMAZENIL 0.1 MG/ML
0.2 INJECTION INTRAVENOUS
Status: DISCONTINUED | OUTPATIENT
Start: 2021-04-14 | End: 2021-04-14 | Stop reason: HOSPADM

## 2021-04-14 RX ORDER — SODIUM CHLORIDE 0.9 % (FLUSH) 0.9 %
5-40 SYRINGE (ML) INJECTION EVERY 8 HOURS
Status: DISCONTINUED | OUTPATIENT
Start: 2021-04-14 | End: 2021-04-14 | Stop reason: HOSPADM

## 2021-04-14 RX ORDER — SODIUM CHLORIDE 0.9 % (FLUSH) 0.9 %
5-40 SYRINGE (ML) INJECTION AS NEEDED
Status: DISCONTINUED | OUTPATIENT
Start: 2021-04-14 | End: 2021-04-14 | Stop reason: HOSPADM

## 2021-04-14 RX ORDER — ATROPINE SULFATE 0.1 MG/ML
0.5 INJECTION INTRAVENOUS
Status: DISCONTINUED | OUTPATIENT
Start: 2021-04-14 | End: 2021-04-14 | Stop reason: HOSPADM

## 2021-04-14 RX ADMIN — PROPOFOL 220 MG: 10 INJECTION, EMULSION INTRAVENOUS at 14:55

## 2021-04-14 RX ADMIN — LIDOCAINE HYDROCHLORIDE 40 MG: 20 INJECTION, SOLUTION EPIDURAL; INFILTRATION; INTRACAUDAL; PERINEURAL at 14:40

## 2021-04-14 RX ADMIN — SODIUM CHLORIDE 50 ML/HR: 9 INJECTION, SOLUTION INTRAVENOUS at 14:32

## 2021-04-14 NOTE — ROUTINE PROCESS
Ana Laura Chan 1960 
[id number] Situation: 
Verbal report received from: Shasha Procedure: Procedure(s): 
COLONOSCOPY 
COLON BIOPSY ENDOSCOPIC POLYPECTOMY Background: 
 
Preoperative diagnosis: DIARRHEA Postoperative diagnosis: Colon Polyps, Internal Hemorrhoids, Inadequate prep :  Dr. Katrin Geronimo Assistant(s): Endoscopy Technician-1: Nohemi Barajas Endoscopy RN-1: Jasiel Mays Specimens:  
ID Type Source Tests Collected by Time Destination 1 : Random Colon Biopsies Preservative Random colon  Lore Alaniz MD 4/14/2021 1448 Pathology 2 : Sigmoid Colon Polyp Preservative Sigmoid  Lore Alaniz MD 4/14/2021 1451 Pathology 3 : Rectal Polyp Preservative Rectum  Lore Alaniz MD 4/14/2021 1453 Pathology H. Pylori  no Assessment: 
Intra-procedure medications Anesthesia gave intra-procedure sedation and medications, see anesthesia flow sheet yes Intravenous fluids: NS@ [facility] Vital signs stable Abdominal assessment: round and soft Recommendation: 
Discharge patient per MD order. Family or Friend Permission to share finding with family or friend yes

## 2021-04-14 NOTE — ANESTHESIA POSTPROCEDURE EVALUATION
Procedure(s):  COLONOSCOPY  COLON BIOPSY  ENDOSCOPIC POLYPECTOMY. total IV anesthesia    Anesthesia Post Evaluation        Patient location during evaluation: PACU  Note status: Adequate. Level of consciousness: responsive to verbal stimuli and sleepy but conscious  Pain management: satisfactory to patient  Airway patency: patent  Anesthetic complications: no  Cardiovascular status: acceptable  Respiratory status: acceptable  Hydration status: acceptable  Comments: +Post-Anesthesia Evaluation and Assessment    Patient: Annie Le MRN: [id number]  SSN: [id number]   YOB: 1960  Age: 62 y.o. Sex: male      Cardiovascular Function/Vital Signs    /71   Pulse 67   Temp 37 °C (98.6 °F)   Resp 17   Ht 5' 7\" (1.702 m)   Wt 83.7 kg (184 lb 8 oz)   SpO2 95%   BMI 28.90 kg/m²     Patient is status post Procedure(s):  COLONOSCOPY  COLON BIOPSY  ENDOSCOPIC POLYPECTOMY. Nausea/Vomiting: Controlled. Postoperative hydration reviewed and adequate. Pain:  Pain Scale 1: Numeric (0 - 10) (04/14/21 1508)  Pain Intensity 1: 0 (04/14/21 1508)   Managed. Neurological Status: At baseline. Mental Status and Level of Consciousness: Arousable. Pulmonary Status:   O2 Device: None (Room air) (04/14/21 1508)   Adequate oxygenation and airway patent. Complications related to anesthesia: None    Post-anesthesia assessment completed. No concerns. Signed By: Josh Anderson DO    4/14/2021  Post anesthesia nausea and vomiting:  controlled      INITIAL Post-op Vital signs:   Vitals Value Taken Time   /71 04/14/21 1518   Temp 37 °C (98.6 °F) 04/14/21 1508   Pulse 64 04/14/21 1520   Resp 18 04/14/21 1520   SpO2 96 % 04/14/21 1520   Vitals shown include unvalidated device data.

## 2021-04-14 NOTE — DISCHARGE INSTRUCTIONS
Geri Hazard  [id number]  1960    COLON DISCHARGE INSTRUCTIONS  Discomfort:  Redness at IV site- apply warm compress to area; if redness or soreness persist- contact your physician  There may be a slight amount of blood passed from the rectum  Gaseous discomfort- walking, belching will help relieve any discomfort  You may not operate a vehicle for 12 hours  You may not engage in an occupation involving machinery or appliances for rest of today  You may not drink alcoholic beverages for at least 12 hours  Avoid making any critical decisions for at least 24 hour  DIET:   Regular diet. - however -  remember your colon is empty and a heavy meal will produce gas. Avoid these foods:  vegetables, fried / greasy foods, carbonated drinks for today  MEDICATION:  Per Medication Reconciliation       ACTIVITY:  You may not resume your normal daily activities until tomorrow AM; it is recommended that you spend the remainder of the day resting -  avoid any strenuous activity. CALL M.D. ANY SIGN OF:   Increasing pain, nausea, vomiting  Abdominal distension (swelling)  New increased bleeding (oral or rectal)  Fever (chills)  Pain in chest area  Bloody discharge from nose or mouth  Shortness of breath    You may not  take any Advil, Aspirin, Ibuprofen, Motrin, Aleve, or Goodys for 10 days, ONLY  Tylenol as needed for pain. IMPRESSION:  Impression:    1. 3 mm sessile polyp in sigmoid colon. Removed by cold snare polypectomy  2. 8 mm sessile polyp in rectum. Removed by cold snare polypectomy  3. Small internal hemorrhoids seen on retroflexion  4. Otherwise normal colonoscopy through to the cecum. Biopsies taken of whole colon to evaluate for microscopic colitis given patient's urgency. Note bowel preparation was suboptimal, especially on right side    Recommendations:  1. Follow up pathology  2.  Repeat colonoscopy in 1 year with extended bowel preparation    Follow-up Instructions:   Call Dr. Marilin Coats for the results of procedure / biopsy in 7-10 days  Telephone # [phone]      Lorne Davila MD

## 2021-04-14 NOTE — ANESTHESIA PREPROCEDURE EVALUATION
Relevant Problems   No relevant active problems       Anesthetic History   No history of anesthetic complications            Review of Systems / Medical History  Patient summary reviewed, nursing notes reviewed and pertinent labs reviewed    Pulmonary          Smoker      Comments: Current Some Day Smoker   Neuro/Psych   Within defined limits           Cardiovascular  Within defined limits              Pertinent negatives: No CAD  Exercise tolerance: >4 METS  Comments: Denies chest pain or hrt probs   GI/Hepatic/Renal     GERD          Comments: DIARRHEA Endo/Other        Arthritis     Other Findings   Comments: History of alcohol abuse  Hx Carpal tunnel syndrome     Snores when he sleeps         Physical Exam    Airway  Mallampati: III  TM Distance: 4 - 6 cm  Neck ROM: normal range of motion   Mouth opening: Normal     Cardiovascular  Regular rate and rhythm,  S1 and S2 normal,  no murmur, click, rub, or gallop             Dental    Dentition: Caps/crowns  Comments: Veneers across front   Pulmonary  Breath sounds clear to auscultation               Abdominal  GI exam deferred       Other Findings            Anesthetic Plan    ASA: 2  Anesthesia type: total IV anesthesia          Induction: Intravenous  Anesthetic plan and risks discussed with: Patient

## 2021-04-14 NOTE — PROCEDURES
NAME:  Evelina Caballero   :   1960   MRN:   [id number]     Date/Time:  2021 3:02 PM    Colonoscopy Operative Report    Procedure Type:   Colonoscopy with biopsy, polypectomy (cold snare)     Indications:     Personal history of colon polyps (screening only)  Pre-operative Diagnosis: see indication above  Post-operative Diagnosis:  See findings below  :  Tamara Leach MD  Referring Provider: --Soco Guzmán MD    Exam:  Airway: clear, no airway problems anticipated  Heart: RRR, without gallops or rubs  Lungs: clear bilaterally without wheezes, crackles, or rhonchi  Abdomen: soft, nontender, nondistended, bowel sounds present  Mental Status: awake, alert and oriented to person, place and time    Sedation:  MAC anesthesia Propofol  Procedure Details:  After informed consent was obtained with all risks and benefits of procedure explained and preoperative exam completed, the patient was taken to the endoscopy suite and placed in the left lateral decubitus position. Upon sequential sedation as per above, a digital rectal exam was performed demonstrating internal hemorrhoids. The Olympus videocolonoscope  was inserted in the rectum and carefully advanced to the cecum, which was identified by the ileocecal valve and appendiceal orifice. The quality of preparation was inadequate. The colonoscope was slowly withdrawn with careful evaluation between folds. Retroflexion in the rectum was completed demonstrating internal hemorrhoids. Findings:   1. 3 mm sessile polyp in sigmoid colon. Removed by cold snare polypectomy  2. 8 mm sessile polyp in rectum. Removed by cold snare polypectomy  3. Small internal hemorrhoids seen on retroflexion  4. Otherwise normal colonoscopy through to the cecum. Biopsies taken of whole colon to evaluate for microscopic colitis given patient's urgency. Note bowel preparation was suboptimal, especially on right side    Specimen Removed:  1. Whole colon 2.  Sigmoid polyp 3. Rectal polyp  Complications: None. EBL:  None. Impression:    1. 3 mm sessile polyp in sigmoid colon. Removed by cold snare polypectomy  2. 8 mm sessile polyp in rectum. Removed by cold snare polypectomy  3. Small internal hemorrhoids seen on retroflexion  4. Otherwise normal colonoscopy through to the cecum. Biopsies taken of whole colon to evaluate for microscopic colitis given patient's urgency. Note bowel preparation was suboptimal, especially on right side    Recommendations:  1. Follow up pathology  2. Repeat colonoscopy in 1 year with extended bowel preparation    Discharge Disposition:  Home in the company of a  when able to ambulate.       Milderd Sandhoff, MD

## 2021-04-14 NOTE — PROGRESS NOTES
Endoscope was pre-cleaned at the bedside immediately following procedure by Versa Phoenix. For medications administered by anesthesia, see anesthesia chart.

## 2021-04-14 NOTE — H&P
Gastroenterology Outpatient History and Physical    Patient: Candi Servin    Physician: Reese Curtis MD    Chief Complaint: Diarrhea  History of Present Illness: No other complaints    History:  Past Medical History:   Diagnosis Date    Arthritis     Carpal tunnel syndrome     GERD (gastroesophageal reflux disease)     Lower urinary tract symptoms (LUTS)       Past Surgical History:   Procedure Laterality Date    HX CHOLECYSTECTOMY      HX HEENT Right     ear surgery      Social History     Socioeconomic History    Marital status: SINGLE     Spouse name: Not on file    Number of children: Not on file    Years of education: Not on file    Highest education level: Not on file   Tobacco Use    Smoking status: Current Some Day Smoker     Types: Cigarettes     Last attempt to quit: 2014     Years since quittin.2    Smokeless tobacco: Never Used    Tobacco comment: 1-2 a day   Substance and Sexual Activity    Alcohol use: Yes     Comment: occasonally on the weekends    Drug use: No      Family History   Problem Relation Age of Onset    Liver Disease Father       Patient Active Problem List   Diagnosis Code    Status post cholecystectomy Z90.49    History of alcohol abuse F10.11       Allergies: No Known Allergies  Medications:   Prior to Admission medications    Medication Sig Start Date End Date Taking? Authorizing Provider   b complex vitamins tablet Take 1 Tab by mouth daily. Yes Provider, Historical   OTHER Beet powder supplement daily   Yes Provider, Historical   cholecalciferol (Vitamin D3) (5000 Units/125 mcg) tab tablet Take 5,000 Units by mouth daily. Yes Provider, Historical   biotin 10,000 mcg cap Take 1 Tab by mouth daily. Yes Provider, Historical   calcium carbonate (CALTREX) 600 mg calcium (1,500 mg) tablet Take 600 mg by mouth daily. Yes Provider, Historical   docosahexaenoic acid/epa (FISH OIL PO) Take 2,400 mg by mouth daily.    Yes Provider, Historical   TURMERIC PO Take 1 Tab by mouth daily. Yes Provider, Historical   milk thistle 500 mg cap Take 1,000 mg by mouth daily. Yes Provider, Historical   colestipoL (COLESTID) 1 gram tablet 1 g. 1 tablet 2-3 times a week 1/28/21  Yes Provider, Historical   vitamin e 600 unit capsule Take 600 Units by mouth daily. Provider, Historical     Physical Exam:   Vital Signs: Blood pressure 119/69, pulse (!) 56, temperature 98.5 °F (36.9 °C), resp. rate 13, height 5' 7\" (1.702 m), weight 83.7 kg (184 lb 8 oz), SpO2 100 %.   General: well developed, well nourished   HEENT: unremarkable   Heart: regular rhythm no mumur    Lungs: clear   Abdominal:  benign   Neurological: unremarkable   Extremities: no edema     Findings/Diagnosis: Diarrhea  Plan of Care/Planned Procedure: Colonoscopy with conscious/deep sedation    Signed:  Lj Wiseman MD 4/14/2021

## 2021-04-16 PROBLEM — F10.10 ALCOHOL ABUSE: Status: ACTIVE | Noted: 2021-04-16

## 2021-04-27 ENCOUNTER — TRANSCRIBE ORDER (OUTPATIENT)
Dept: SCHEDULING | Age: 61
End: 2021-04-27

## 2021-04-27 DIAGNOSIS — F17.219 NICOTINE DEPENDENCE, CIGARETTES, WITH UNSPECIFIED NICOTINE-INDUCED DISORDERS: Primary | ICD-10-CM

## 2021-04-30 ENCOUNTER — HOSPITAL ENCOUNTER (OUTPATIENT)
Dept: CT IMAGING | Age: 61
Discharge: HOME | End: 2021-04-30
Attending: INTERNAL MEDICINE
Payer: MEDICAID

## 2021-04-30 DIAGNOSIS — F17.219 NICOTINE DEPENDENCE, CIGARETTES, WITH UNSPECIFIED NICOTINE-INDUCED DISORDERS: ICD-10-CM

## 2021-04-30 PROCEDURE — 71271 CT THORAX LUNG CANCER SCR C-: CPT

## 2021-05-12 ENCOUNTER — TRANSCRIBE ORDER (OUTPATIENT)
Dept: SCHEDULING | Age: 61
End: 2021-05-12

## 2021-05-12 DIAGNOSIS — R93.89 ABNORMAL CHEST CT: Primary | ICD-10-CM

## 2021-05-12 DIAGNOSIS — K86.89 PANCREATIC MASS: ICD-10-CM

## 2021-05-21 ENCOUNTER — HOSPITAL ENCOUNTER (OUTPATIENT)
Dept: CT IMAGING | Age: 61
End: 2021-05-21
Attending: INTERNAL MEDICINE
Payer: MEDICAID

## 2021-05-21 ENCOUNTER — HOSPITAL ENCOUNTER (OUTPATIENT)
Dept: CT IMAGING | Age: 61
Discharge: HOME | End: 2021-05-21
Attending: INTERNAL MEDICINE
Payer: MEDICAID

## 2021-05-21 DIAGNOSIS — R93.89 ABNORMAL CHEST CT: ICD-10-CM

## 2021-05-21 DIAGNOSIS — K86.89 PANCREATIC MASS: ICD-10-CM

## 2021-05-21 PROCEDURE — 71250 CT THORAX DX C-: CPT

## 2021-05-21 PROCEDURE — 74011000636 HC RX REV CODE- 636: Performed by: INTERNAL MEDICINE

## 2021-05-21 PROCEDURE — 74170 CT ABD WO CNTRST FLWD CNTRST: CPT

## 2021-05-21 RX ADMIN — IOPAMIDOL 100 ML: 755 INJECTION, SOLUTION INTRAVENOUS at 13:29

## 2021-10-07 ENCOUNTER — TRANSCRIBE ORDER (OUTPATIENT)
Dept: SCHEDULING | Age: 61
End: 2021-10-07

## 2021-10-07 DIAGNOSIS — Z87.891 HISTORY OF TOBACCO USE: Primary | ICD-10-CM

## 2021-10-08 ENCOUNTER — TRANSCRIBE ORDER (OUTPATIENT)
Dept: SCHEDULING | Age: 61
End: 2021-10-08

## 2022-01-03 ENCOUNTER — TRANSCRIBE ORDER (OUTPATIENT)
Dept: SCHEDULING | Age: 62
End: 2022-01-03

## 2022-01-03 DIAGNOSIS — Z87.891 PERSONAL HISTORY OF TOBACCO USE, PRESENTING HAZARDS TO HEALTH: Primary | ICD-10-CM

## 2022-03-18 PROBLEM — Z90.49 STATUS POST CHOLECYSTECTOMY: Status: ACTIVE | Noted: 2021-03-24

## 2022-03-19 PROBLEM — F10.10 ALCOHOL ABUSE: Status: ACTIVE | Noted: 2021-04-16

## 2022-03-19 PROBLEM — F10.11 HISTORY OF ALCOHOL ABUSE: Status: ACTIVE | Noted: 2021-03-24

## 2023-09-13 ENCOUNTER — HOSPITAL ENCOUNTER (OUTPATIENT)
Facility: HOSPITAL | Age: 63
Setting detail: OUTPATIENT SURGERY
Discharge: HOME | End: 2023-09-13
Attending: INTERNAL MEDICINE | Admitting: INTERNAL MEDICINE
Payer: MEDICAID

## 2023-09-13 ENCOUNTER — ANESTHESIA EVENT (OUTPATIENT)
Facility: HOSPITAL | Age: 63
End: 2023-09-13
Payer: MEDICAID

## 2023-09-13 ENCOUNTER — ANESTHESIA (OUTPATIENT)
Facility: HOSPITAL | Age: 63
End: 2023-09-13
Payer: MEDICAID

## 2023-09-13 VITALS
DIASTOLIC BLOOD PRESSURE: 80 MMHG | OXYGEN SATURATION: 97 % | HEART RATE: 56 BPM | HEIGHT: 67 IN | TEMPERATURE: 97.8 F | WEIGHT: 185 LBS | SYSTOLIC BLOOD PRESSURE: 111 MMHG | RESPIRATION RATE: 22 BRPM | BODY MASS INDEX: 29.03 KG/M2

## 2023-09-13 PROCEDURE — 3600007502: Performed by: INTERNAL MEDICINE

## 2023-09-13 PROCEDURE — 3700000001 HC ADD 15 MINUTES (ANESTHESIA): Performed by: INTERNAL MEDICINE

## 2023-09-13 PROCEDURE — 7100000010 HC PHASE II RECOVERY - FIRST 15 MIN: Performed by: INTERNAL MEDICINE

## 2023-09-13 PROCEDURE — 2580000003 HC RX 258: Performed by: INTERNAL MEDICINE

## 2023-09-13 PROCEDURE — 2500000003 HC RX 250 WO HCPCS: Performed by: NURSE ANESTHETIST, CERTIFIED REGISTERED

## 2023-09-13 PROCEDURE — 2709999900 HC NON-CHARGEABLE SUPPLY: Performed by: INTERNAL MEDICINE

## 2023-09-13 PROCEDURE — 3700000000 HC ANESTHESIA ATTENDED CARE: Performed by: INTERNAL MEDICINE

## 2023-09-13 PROCEDURE — 88305 TISSUE EXAM BY PATHOLOGIST: CPT

## 2023-09-13 PROCEDURE — 6360000002 HC RX W HCPCS: Performed by: NURSE ANESTHETIST, CERTIFIED REGISTERED

## 2023-09-13 PROCEDURE — 3600007512: Performed by: INTERNAL MEDICINE

## 2023-09-13 RX ORDER — LIDOCAINE HYDROCHLORIDE 20 MG/ML
INJECTION, SOLUTION EPIDURAL; INFILTRATION; INTRACAUDAL; PERINEURAL PRN
Status: DISCONTINUED | OUTPATIENT
Start: 2023-09-13 | End: 2023-09-13

## 2023-09-13 RX ORDER — SODIUM CHLORIDE 0.9 % (FLUSH) 0.9 %
5-40 SYRINGE (ML) INJECTION PRN
Status: DISCONTINUED | OUTPATIENT
Start: 2023-09-13 | End: 2023-09-13 | Stop reason: HOSPADM

## 2023-09-13 RX ORDER — SODIUM CHLORIDE 9 MG/ML
25 INJECTION, SOLUTION INTRAVENOUS PRN
Status: DISCONTINUED | OUTPATIENT
Start: 2023-09-13 | End: 2023-09-13 | Stop reason: HOSPADM

## 2023-09-13 RX ORDER — SODIUM CHLORIDE 0.9 % (FLUSH) 0.9 %
5-40 SYRINGE (ML) INJECTION EVERY 12 HOURS SCHEDULED
Status: DISCONTINUED | OUTPATIENT
Start: 2023-09-13 | End: 2023-09-13 | Stop reason: HOSPADM

## 2023-09-13 RX ORDER — LIDOCAINE HYDROCHLORIDE 20 MG/ML
INJECTION, SOLUTION EPIDURAL; INFILTRATION; INTRACAUDAL; PERINEURAL PRN
Status: DISCONTINUED | OUTPATIENT
Start: 2023-09-13 | End: 2023-09-13 | Stop reason: SDUPTHER

## 2023-09-13 RX ADMIN — PROPOFOL 50 MG: 10 INJECTION, EMULSION INTRAVENOUS at 14:30

## 2023-09-13 RX ADMIN — LIDOCAINE HYDROCHLORIDE 50 MG: 20 INJECTION, SOLUTION EPIDURAL; INFILTRATION; INTRACAUDAL; PERINEURAL at 14:30

## 2023-09-13 RX ADMIN — PROPOFOL 20 MG: 10 INJECTION, EMULSION INTRAVENOUS at 14:33

## 2023-09-13 RX ADMIN — SODIUM CHLORIDE 25 ML: 9 INJECTION, SOLUTION INTRAVENOUS at 14:12

## 2023-09-13 RX ADMIN — PROPOFOL 30 MG: 10 INJECTION, EMULSION INTRAVENOUS at 14:37

## 2023-09-13 RX ADMIN — PROPOFOL 20 MG: 10 INJECTION, EMULSION INTRAVENOUS at 14:40

## 2023-09-13 RX ADMIN — PROPOFOL 50 MG: 10 INJECTION, EMULSION INTRAVENOUS at 14:32

## 2023-09-13 RX ADMIN — PROPOFOL 20 MG: 10 INJECTION, EMULSION INTRAVENOUS at 14:38

## 2023-09-13 RX ADMIN — PROPOFOL 50 MG: 10 INJECTION, EMULSION INTRAVENOUS at 14:35

## 2023-09-13 ASSESSMENT — LIFESTYLE VARIABLES: SMOKING_STATUS: 0

## 2023-09-13 ASSESSMENT — PAIN - FUNCTIONAL ASSESSMENT: PAIN_FUNCTIONAL_ASSESSMENT: NONE - DENIES PAIN

## 2023-09-13 NOTE — OP NOTE
NAME:  Mehul Valladares   :   1960   MRN:   [id number]     Date/Time:  2023 2:44 PM    Colonoscopy Operative Report    Procedure Type:   Colonoscopy with polypectomy (cold snare)     Indications:     Screening colonoscopy  Pre-operative Diagnosis: see indication above  Post-operative Diagnosis:  See findings below  :  Gale Nino MD  Referring Provider: --Moses Car MD    Exam:  Airway: clear, no airway problems anticipated  Heart: RRR, without gallops or rubs  Lungs: clear bilaterally without wheezes, crackles, or rhonchi  Abdomen: soft, nontender, nondistended, bowel sounds present  Mental Status: awake, alert and oriented to person, place and time    Sedation:  MAC anesthesia Propofol  Procedure Details:  After informed consent was obtained with all risks and benefits of procedure explained and preoperative exam completed, the patient was taken to the endoscopy suite and placed in the left lateral decubitus position. Upon sequential sedation as per above, a digital rectal exam was performed demonstrating internal hemorrhoids. The Olympus videocolonoscope  was inserted in the rectum and carefully advanced to the cecum, which was identified by the ileocecal valve and appendiceal orifice. The quality of preparation was good. The colonoscope was slowly withdrawn with careful evaluation between folds. Retroflexion in the rectum was completed demonstrating internal hemorrhoids. Findings:   Two 4-10 mm sessile polyps in ascending colon. Removed by cold snare polypectomy  8 mm sessile polyp in transverse colon. Removed by cold snare polypectomy  3 mm sessile polyp in descending colon. Removed by cold snare polypectomy  Medium sized internal hemorrhoids seen on retroflexion  Otherwise normal colonoscopy through to the cecum    Specimen Removed:  1. Ascending colon polyps 2. Transverse colon polyp 3. Descending colon polyp  Complications: None. EBL:  None.     Impression:    Two

## 2023-09-13 NOTE — ANESTHESIA POSTPROCEDURE EVALUATION
Department of Anesthesiology  Postprocedure Note    Patient: Mehul Valladares  MRN: [id number]  YOB: 1960  Date of evaluation: 9/13/2023      Procedure Summary     Date: 09/13/23 [facility]    Anesthesia Start: 8951 Anesthesia Stop: 8428    Procedure: COLONOSCOPY WITH BIOPSY AND POLYPECTOMY (Lower GI Region) Diagnosis:       Diarrhea, unspecified type      Screening for colon cancer      (Diarrhea, unspecified type [R19.7])      (Screening for colon cancer [Z12.11])    Surgeons: Peri Brenner MD Responsible Provider: Hailey Temple MD    Anesthesia Type: MAC ASA Status: 2          Anesthesia Type: No value filed.     Dayana Phase I: Dayana Score: 10    Dayana Phase II: Dayana Score: 10      Anesthesia Post Evaluation    Patient location during evaluation: PACU  Patient participation: complete - patient participated  Level of consciousness: awake and alert  Airway patency: patent  Nausea & Vomiting: no nausea and no vomiting  Complications: no  Cardiovascular status: hemodynamically stable  Respiratory status: acceptable  Hydration status: stable

## 2023-09-13 NOTE — PROGRESS NOTES
ARRIVAL INFORMATION:  Verified patient name and date of birth, scheduled procedure, and informed consent. : Margarita Rivera (son) contact number: [phone]  Physician and staff can share information with the . Belongings with patient include:  Clothing, phone    GI FOCUSED ASSESSMENT:  Neuro: Awake, alert, oriented x4  Respiratory: even and unlabored   GI: soft and non-distended  EKG Rhythm: normal sinus rhythm    Education:Reviewed general discharge instructions and  information.

## 2023-09-13 NOTE — PROGRESS NOTES
Endoscopy Case End Note:    0136:  Procedure scope was pre-cleaned, per protocol, at bedside by Surinder ESCOBEDO      1786:  Report received from anesthesia - [facility] CRNA student.   See anesthesia flowsheet for intra-procedure vital signs and events

## 2023-09-13 NOTE — H&P
Gastroenterology Outpatient History and Physical    Patient: Joao Paz    Physician: Albania Hollingsworth MD    Chief Complaint: CRC screening  History of Present Illness: Diarrhea/IBS    History:  Past Medical History:   Diagnosis Date    Arthritis     Carpal tunnel syndrome     GERD (gastroesophageal reflux disease)     Lower urinary tract symptoms (LUTS)       Past Surgical History:   Procedure Laterality Date    CHOLECYSTECTOMY      COLONOSCOPY N/A 2021    COLONOSCOPY performed by Yaneli Aggarwal MD at [facility] ENDOSCOPY    HEENT Right     ear surgery      Social History     Socioeconomic History    Marital status: Single     Spouse name: None    Number of children: None    Years of education: None    Highest education level: None   Tobacco Use    Smoking status: Some Days     Types: Cigarettes     Last attempt to quit: 2014     Years since quittin.6    Smokeless tobacco: Never    Tobacco comments:     Quit smokin-2 a day   Substance and Sexual Activity    Alcohol use: Yes    Drug use: No      Family History   Problem Relation Age of Onset    Liver Disease Father       Patient Active Problem List   Diagnosis    Status post cholecystectomy    History of alcohol abuse    Alcohol abuse       Allergies: No Known Allergies  Medications:   Prior to Admission medications    Medication Sig Start Date End Date Taking?  Authorizing Provider   TURMERIC PO Take 1 tablet by mouth daily    Ar Automatic Reconciliation   Biotin 10 MG CAPS Take 1 tablet by mouth daily    Ar Automatic Reconciliation   calcium carbonate 1500 (600 Ca) MG TABS tablet Take by mouth daily    Ar Automatic Reconciliation   vitamin D3 (CHOLECALCIFEROL) 125 MCG (5000 UT) TABS tablet Take by mouth daily    Ar Automatic Reconciliation   colestipol (COLESTID) 1 g tablet 1 g. 1 tablet 2-3 times a week 21   Ar Automatic Reconciliation   vitamin E 600 units capsule Take by mouth daily    Ar Automatic Reconciliation     Physical Exam:

## 2024-07-25 ENCOUNTER — TELEPHONE (OUTPATIENT)
Age: 64
End: 2024-07-25

## 2024-07-25 NOTE — TELEPHONE ENCOUNTER
Called Dr. Breezy Foster's office to request a legible referral, progress notes, and medication list. Spoke with Dr. Foster. He states the patient is being referred for an evaluation for possible ADD. At the patient's last visit he mentioned having issues with his focus and attention throughout the years dating back to grade school. He states the patient is very healthy and his medical history is unremarkable. He is not currently taking any medications so he is unable to provide a medication list. He informed me that he hand writes all of his notes and what he sent over is as good as his hand writing gets. He is more than happy to set up a phone meeting with the provider to translate his notes and answer any questions.

## 2024-09-06 ENCOUNTER — TELEMEDICINE (OUTPATIENT)
Age: 64
End: 2024-09-06

## 2024-09-06 DIAGNOSIS — F41.9 ANXIETY: ICD-10-CM

## 2024-09-06 DIAGNOSIS — R47.89 WORD FINDING DIFFICULTY: ICD-10-CM

## 2024-09-06 DIAGNOSIS — R41.840 ATTENTION AND CONCENTRATION DEFICIT: Primary | ICD-10-CM

## 2024-09-06 DIAGNOSIS — R45.89 SYMPTOMS OF DEPRESSION: ICD-10-CM

## 2024-09-06 DIAGNOSIS — R41.3 MEMORY LOSS: ICD-10-CM

## 2024-10-14 ENCOUNTER — TELEPHONE (OUTPATIENT)
Age: 64
End: 2024-10-14

## 2024-10-14 NOTE — TELEPHONE ENCOUNTER
Called and left a message to see if patient would be interesting in a testing appointment for tomorrow at 8 AM.

## 2025-04-11 ENCOUNTER — TELEPHONE (OUTPATIENT)
Age: 65
End: 2025-04-11

## 2025-04-11 NOTE — TELEPHONE ENCOUNTER
Received a call back from patient to possibly schedule a sooner testing appointment.  He requests a call back.

## 2025-04-11 NOTE — TELEPHONE ENCOUNTER
Returned call to patient. LM informing we called to offer him a sooner testing appt. I asked that he call back if he would like to schedule a sooner appt.

## 2025-04-11 NOTE — TELEPHONE ENCOUNTER
Patient states that he missed a call from your office this morning to schedule an appointment.  He requests a call back.

## 2025-04-25 ENCOUNTER — PROCEDURE VISIT (OUTPATIENT)
Age: 65
End: 2025-04-25
Payer: MEDICAID

## 2025-04-25 DIAGNOSIS — Z65.8 INADEQUATE SOCIAL SUPPORT: ICD-10-CM

## 2025-04-25 DIAGNOSIS — F90.2 ATTENTION DEFICIT HYPERACTIVITY DISORDER (ADHD), COMBINED TYPE: Primary | ICD-10-CM

## 2025-04-25 DIAGNOSIS — F34.1 DYSTHYMIA: ICD-10-CM

## 2025-04-25 DIAGNOSIS — F43.10 PTSD (POST-TRAUMATIC STRESS DISORDER): ICD-10-CM

## 2025-04-25 PROCEDURE — 96130 PSYCL TST EVAL PHYS/QHP 1ST: CPT | Performed by: PSYCHOLOGIST

## 2025-04-25 PROCEDURE — 96138 PSYCL/NRPSYC TECH 1ST: CPT | Performed by: PSYCHOLOGIST

## 2025-04-25 PROCEDURE — 96131 PSYCL TST EVAL PHYS/QHP EA: CPT | Performed by: PSYCHOLOGIST

## 2025-04-25 PROCEDURE — 96136 PSYCL/NRPSYC TST PHY/QHP 1ST: CPT | Performed by: PSYCHOLOGIST

## 2025-04-25 PROCEDURE — 96139 PSYCL/NRPSYC TST TECH EA: CPT | Performed by: PSYCHOLOGIST

## 2025-04-28 NOTE — PROGRESS NOTES
Content no hallucinations and no delusions   Suicidal ideations none   Homicidal ideations none   Mood:  euthymic   Affect:  constricted   Memory recent  adequate   Memory remote:  adequate   Concentration:  adequate   Abstraction:  abstract   Insight:  fair   Reliability All questions answered.   Judgment:  fair         METHODS OF ASSESSMENT (Current Evaluation):  Clinician Administered:  Detailed Assessment of Posttraumatic Stress (DAPS)  Mood Assessment Scale (MAS)  Personality Assessment Inventory (GLORIA-2)    Technician Administered: Trina Quiñonez's Continuous Auditory Test of Attention  Khang's Continuous Performance Test  Controlled Oral Word Association Test  Neuropsychological Assessment Battery-select subtests  Reliable Digit Span  Trailmaking Test  Wechsler Adult Intelligence Scale-IV  [facility] Rating Scale    TEST OBSERVATIONS:  Mr. Petty arrived promptly for the testing session. He reported sleeping well and eating prior to testing. Dress and grooming were appropriate; physical presentation was unchanged from that observed during the clinical interview.  Speech was fluent and coherent with normal rate, rhythm, tone, and volume.  No expressive or receptive language deficits were noted.  No unusual behaviors or psychomotor abnormalities were observed. Thought process was logical, relevant, and focused. Thought content showed no apparent delusional ideation. Auditory and visual hallucinations were denied and there was no obvious response to internal stimuli. Affect was congruent with the euthymic mood conveyed. Mr. Petty was adequately cooperative and appeared to put forth good effort throughout this examination.  Rapport with the examiner was adequately established and maintained.  Minimal prompting was required.  Comprehension of test instructions was occasionally problematic, requiring repetition.  Performance motivation was objectively measured with one instrument (Reliable Digit Span);

## 2025-05-29 ENCOUNTER — TELEMEDICINE (OUTPATIENT)
Age: 65
End: 2025-05-29

## 2025-05-29 DIAGNOSIS — F34.1 DYSTHYMIA: ICD-10-CM

## 2025-05-29 DIAGNOSIS — F90.2 ATTENTION DEFICIT HYPERACTIVITY DISORDER (ADHD), COMBINED TYPE: Primary | ICD-10-CM

## 2025-05-29 DIAGNOSIS — Z65.8 INADEQUATE SOCIAL SUPPORT: ICD-10-CM

## 2025-05-29 DIAGNOSIS — F43.10 PTSD (POST-TRAUMATIC STRESS DISORDER): ICD-10-CM

## 2025-05-29 NOTE — PROGRESS NOTES
Interactive Psychotherapy/office feedback        Interactive office feedback session with Mr Petty.  I reviewed the results of the recent Neuropsychological Evaluation  including the observed areas of neurocognitive strengths and weaknesses. Education was provided regarding my diagnostic impressions, and treatment plan/options were discussed. I also answered numerous questions related to the clinical findings, including the various methods to improve cognition and mood. CBT, psychoeducation, and supportive psychotherapy techniques were utilized.     I did not see any neuroimaging in the available records.         Prior to seeing the patient I reviewed the records, including the previously completed report, the records in [facility], and any updated visits from other providers since I saw the patient last.       Diagnoses:      ADHD, combined type  PTSD  Dysthymia  Inadequate Social Support  Alcohol Use    The patient will follow up with the referring provider, and reported being very pleased with the services provided.  Follow up with [facility] prn.         [address]52 psychotherapy 30 Minutes      This note was created using voice recognition software. Despite editing, there may be syntax errors.         Yasmany Petty, was evaluated through a synchronous (real-time) audio-video encounter. The patient (or guardian if applicable) is aware that this is a billable service, which includes applicable co-pays. This Virtual Visit was conducted with patient's (and/or legal guardian's) consent. Patient identification was verified, and a caregiver was present when appropriate.   The patient was located at Home: [address] [place] [id number]  Provider was located at Home [place]Appt Dept State[place]  Confirm you are appropriately licensed, registered, or certified to deliver care in the state where the patient is located as indicated above. If you are not or unsure, please re-schedule the visit: Yes, I confirm.

## 2025-06-03 ENCOUNTER — TELEPHONE (OUTPATIENT)
Age: 65
End: 2025-06-03

## 2025-06-03 NOTE — TELEPHONE ENCOUNTER
Patient called asking that we send over his neuropsychology results to his PCP (referring provider) Dr. Breezy Foster. Patient states he had an appointment with him yesterday and was advised that they have not yet received them. Please advise.     Dr. Breezy Cagle    Ph #: [phone]    Fax #: [phone]

## 2025-06-04 ENCOUNTER — HOSPITAL ENCOUNTER (OUTPATIENT)
Facility: HOSPITAL | Age: 65
Discharge: HOME | End: 2025-06-07
Payer: MEDICAID

## 2025-06-04 DIAGNOSIS — F10.11 HISTORY OF ALCOHOL ABUSE: ICD-10-CM

## 2025-06-04 DIAGNOSIS — R19.5 LOOSE STOOLS: ICD-10-CM

## 2025-06-04 DIAGNOSIS — R10.12 ABDOMINAL PAIN, LEFT UPPER QUADRANT: ICD-10-CM

## 2025-06-04 PROCEDURE — 6360000004 HC RX CONTRAST MEDICATION

## 2025-06-04 PROCEDURE — 74177 CT ABD & PELVIS W/CONTRAST: CPT

## 2025-06-04 PROCEDURE — 76981 USE PARENCHYMA: CPT

## 2025-06-04 RX ORDER — IOPAMIDOL 755 MG/ML
100 INJECTION, SOLUTION INTRAVASCULAR
Status: COMPLETED | OUTPATIENT
Start: 2025-06-04 | End: 2025-06-04

## 2025-06-04 RX ADMIN — IOPAMIDOL 100 ML: 755 INJECTION, SOLUTION INTRAVENOUS at 15:11

## 2025-09-03 ENCOUNTER — TRANSCRIBE ORDERS (OUTPATIENT)
Facility: HOSPITAL | Age: 65
End: 2025-09-03

## 2025-09-03 DIAGNOSIS — M47.812 CERVICAL SPONDYLOSIS WITHOUT MYELOPATHY: ICD-10-CM

## 2025-09-03 DIAGNOSIS — M47.816 SPONDYLOSIS OF LUMBAR REGION WITHOUT MYELOPATHY OR RADICULOPATHY: ICD-10-CM

## 2025-09-03 DIAGNOSIS — M54.50 LUMBAR PAIN: ICD-10-CM

## 2025-09-03 DIAGNOSIS — M54.2 NECK PAIN: Primary | ICD-10-CM

## 2025-09-03 DIAGNOSIS — M50.30 DDD (DEGENERATIVE DISC DISEASE), CERVICAL: ICD-10-CM

## 2025-09-04 ENCOUNTER — ANESTHESIA EVENT (OUTPATIENT)
Facility: HOSPITAL | Age: 65
End: 2025-09-04
Payer: MEDICAID

## 2025-09-04 RX ORDER — DICYCLOMINE HYDROCHLORIDE 10 MG/1
10 CAPSULE ORAL
COMMUNITY

## 2025-09-04 RX ORDER — MECLIZINE HYDROCHLORIDE 25 MG/1
25 TABLET ORAL 2 TIMES DAILY PRN
COMMUNITY
Start: 2024-09-26

## 2025-09-04 RX ORDER — B-COMPLEX WITH VITAMIN C
1 TABLET ORAL DAILY
COMMUNITY

## 2025-09-04 RX ORDER — CYCLOBENZAPRINE HCL 10 MG
10 TABLET ORAL 3 TIMES DAILY PRN
COMMUNITY
Start: 2024-10-09

## 2025-09-04 RX ORDER — DEXAMETHASONE 4 MG/1
4 TABLET ORAL
COMMUNITY
Start: 2024-10-09

## 2025-09-04 RX ORDER — FINASTERIDE 5 MG/1
5 TABLET, FILM COATED ORAL DAILY
COMMUNITY

## 2025-09-05 ENCOUNTER — HOSPITAL ENCOUNTER (OUTPATIENT)
Facility: HOSPITAL | Age: 65
Setting detail: OUTPATIENT SURGERY
Discharge: HOME | End: 2025-09-05
Attending: INTERNAL MEDICINE | Admitting: INTERNAL MEDICINE
Payer: MEDICAID

## 2025-09-05 ENCOUNTER — ANESTHESIA (OUTPATIENT)
Facility: HOSPITAL | Age: 65
End: 2025-09-05
Payer: MEDICAID

## 2025-09-05 VITALS
TEMPERATURE: 97.5 F | HEART RATE: 69 BPM | SYSTOLIC BLOOD PRESSURE: 116 MMHG | WEIGHT: 194 LBS | RESPIRATION RATE: 16 BRPM | BODY MASS INDEX: 30.45 KG/M2 | HEIGHT: 67 IN | OXYGEN SATURATION: 96 % | DIASTOLIC BLOOD PRESSURE: 70 MMHG

## 2025-09-05 PROCEDURE — 3600007502: Performed by: INTERNAL MEDICINE

## 2025-09-05 PROCEDURE — 2709999900 HC NON-CHARGEABLE SUPPLY: Performed by: INTERNAL MEDICINE

## 2025-09-05 PROCEDURE — 2580000003 HC RX 258

## 2025-09-05 PROCEDURE — 3700000000 HC ANESTHESIA ATTENDED CARE: Performed by: INTERNAL MEDICINE

## 2025-09-05 PROCEDURE — 6360000002 HC RX W HCPCS

## 2025-09-05 PROCEDURE — 7100000010 HC PHASE II RECOVERY - FIRST 15 MIN: Performed by: INTERNAL MEDICINE

## 2025-09-05 RX ORDER — SODIUM CHLORIDE 9 MG/ML
INJECTION, SOLUTION INTRAVENOUS PRN
Status: DISCONTINUED | OUTPATIENT
Start: 2025-09-05 | End: 2025-09-05 | Stop reason: HOSPADM

## 2025-09-05 RX ORDER — SODIUM CHLORIDE 0.9 % (FLUSH) 0.9 %
5-40 SYRINGE (ML) INJECTION PRN
Status: DISCONTINUED | OUTPATIENT
Start: 2025-09-05 | End: 2025-09-05 | Stop reason: HOSPADM

## 2025-09-05 RX ORDER — 0.9 % SODIUM CHLORIDE 0.9 %
INTRAVENOUS SOLUTION INTRAVENOUS
Status: DISCONTINUED | OUTPATIENT
Start: 2025-09-05 | End: 2025-09-05 | Stop reason: SDUPTHER

## 2025-09-05 RX ORDER — SODIUM CHLORIDE 0.9 % (FLUSH) 0.9 %
5-40 SYRINGE (ML) INJECTION EVERY 12 HOURS SCHEDULED
Status: DISCONTINUED | OUTPATIENT
Start: 2025-09-05 | End: 2025-09-05 | Stop reason: HOSPADM

## 2025-09-05 RX ADMIN — PROPOFOL 220 MG: 10 INJECTION, EMULSION INTRAVENOUS at 11:07

## 2025-09-05 RX ADMIN — LIDOCAINE HYDROCHLORIDE 100 MG: 20 INJECTION, SOLUTION EPIDURAL; INFILTRATION; INTRACAUDAL; PERINEURAL at 11:00

## 2025-09-05 RX ADMIN — SODIUM CHLORIDE 200 ML: 9 INJECTION, SOLUTION INTRAVENOUS at 11:07

## 2025-09-05 ASSESSMENT — PAIN - FUNCTIONAL ASSESSMENT: PAIN_FUNCTIONAL_ASSESSMENT: 0-10

## (undated) DEVICE — TRAP,MUCUS SPECIMEN, 80CC: Brand: MEDLINE

## (undated) DEVICE — BITEBLOCK 54FR W/ DENT RIM BLOX

## (undated) DEVICE — Device

## (undated) DEVICE — SNARE ENDOSCP M L240CM W27MM SHTH DIA2.4MM CHN 2.8MM OVL

## (undated) DEVICE — CATHETER IV 20GA L1.16IN OD1.0414-1.1176MM ID0.762-0.8382MM

## (undated) DEVICE — NEONATAL-ADULT SPO2 SENSOR: Brand: NELLCOR

## (undated) DEVICE — IV START KIT: Brand: MEDLINE

## (undated) DEVICE — CONTAINER SPEC 20 ML LID NEUT BUFF FORMALIN 10 % POLYPR STS

## (undated) DEVICE — NON-REM POLYHESIVE PATIENT RETURN ELECTRODE: Brand: VALLEYLAB

## (undated) DEVICE — CATH IV AUTOGRD BC PNK 20GA 25 -- INSYTE

## (undated) DEVICE — ENDOSCOPIC KIT COMPLIANCE ENDOKIT

## (undated) DEVICE — BAG SPEC BIOHZRD 10 X 10 IN --

## (undated) DEVICE — ELECTRODE,RADIOTRANSLUCENT,FOAM,5PK: Brand: MEDLINE

## (undated) DEVICE — 1200 GUARD II KIT W/5MM TUBE W/O VAC TUBE: Brand: GUARDIAN

## (undated) DEVICE — FORCEPS BX L240CM JAW DIA2.8MM L CAP W/ NDL MIC MESH TOOTH

## (undated) DEVICE — SYSTEM REPROC CBL 3 LD DISPOSABLE

## (undated) DEVICE — IV START KIT WITH SECUREMENT DEVICES

## (undated) DEVICE — NEEDLE HYPO 18GA L1.5IN PNK S STL HUB POLYPR SHLD REG BVL

## (undated) DEVICE — SYR 10ML LUER LOK 1/5ML GRAD --

## (undated) DEVICE — Z DISCONTINUED PER MEDLINE LINE GAS SAMPLING O2/CO2 LNG AD 13 FT NSL W/ TBNG FILTERLINE

## (undated) DEVICE — STRAINER URIN CALC RNL MSH -- CONVERT TO ITEM 357634

## (undated) DEVICE — FORCEP BX LG CAP 2.4 MMX120 CM W/ NDL YEL RADIAL JAW 4 DISP

## (undated) DEVICE — TRAP ENDOSCP POLYP 2 CHMBR DRAWER TYP

## (undated) DEVICE — SET GRAV CK VLV NEEDLESS ST 3 GANGED 4WAY STPCOCK HI FLO 10

## (undated) DEVICE — SYR 3ML LL TIP 1/10ML GRAD --

## (undated) DEVICE — CUFF BLD PRSS AD CLTH SGL TB W/ BAYNT CONN ROUNDED CORNER

## (undated) DEVICE — SNARE ENDOSCP POLYP MED STD AD 2.4X27X240 CM 2.8 MM OVL SENS

## (undated) DEVICE — TOWEL 4 PLY TISS 19X30 SUE WHT

## (undated) DEVICE — SOLIDIFIER FLD 2OZ 1500CC N DISINF IN BTL DISP SAFESORB

## (undated) DEVICE — SET ADMIN 16ML TBNG L100IN 2 Y INJ SITE IV PIGGY BK DISP